# Patient Record
Sex: FEMALE | Race: WHITE | NOT HISPANIC OR LATINO | Employment: OTHER | URBAN - METROPOLITAN AREA
[De-identification: names, ages, dates, MRNs, and addresses within clinical notes are randomized per-mention and may not be internally consistent; named-entity substitution may affect disease eponyms.]

---

## 2017-01-23 ENCOUNTER — OFFICE VISIT (OUTPATIENT)
Dept: FAMILY MEDICINE CLINIC | Facility: CLINIC | Age: 62
End: 2017-01-23

## 2017-01-23 VITALS
HEART RATE: 63 BPM | BODY MASS INDEX: 18.95 KG/M2 | SYSTOLIC BLOOD PRESSURE: 118 MMHG | WEIGHT: 103 LBS | TEMPERATURE: 98.3 F | OXYGEN SATURATION: 98 % | HEIGHT: 62 IN | DIASTOLIC BLOOD PRESSURE: 78 MMHG

## 2017-01-23 DIAGNOSIS — Z00.00 HEALTH CARE MAINTENANCE: ICD-10-CM

## 2017-01-23 DIAGNOSIS — M81.0 OSTEOPOROSIS: Primary | ICD-10-CM

## 2017-01-23 DIAGNOSIS — Z78.0 POST-MENOPAUSAL: ICD-10-CM

## 2017-01-23 DIAGNOSIS — M79.671 RIGHT FOOT PAIN: ICD-10-CM

## 2017-01-23 DIAGNOSIS — H91.93 HEARING LOSS, BILATERAL: ICD-10-CM

## 2017-01-23 DIAGNOSIS — Z00.00 ANNUAL PHYSICAL EXAM: ICD-10-CM

## 2017-01-23 DIAGNOSIS — H61.23 BILATERAL IMPACTED CERUMEN: ICD-10-CM

## 2017-01-23 DIAGNOSIS — Z12.31 ENCOUNTER FOR SCREENING MAMMOGRAM FOR BREAST CANCER: ICD-10-CM

## 2017-01-23 PROCEDURE — 99214 OFFICE O/P EST MOD 30 MIN: CPT | Performed by: FAMILY MEDICINE

## 2017-01-23 NOTE — MR AVS SNAPSHOT
Violeta Neff   2017 8:30 AM   Office Visit    Provider:  Baudilio Nicole DO   Department:  [facility]   Dept Phone:  [phone]                Your Full Care Plan              Your Updated Medication List          This list is accurate as of: 17  9:12 AM.  Always use your most recent med list.                CALTRATE 600+D PO       ibuprofen 600 MG tablet   Commonly known as:  ADVIL,MOTRIN   One tablet po Q6H for inflammation and pain               You Were Diagnosed With        Codes Comments    Osteoporosis    -  Primary ICD-10-CM: M81.0  ICD-9-CM: 733.00     Right foot pain     ICD-10-CM: M79.671  ICD-9-CM: 729.5     Bilateral impacted cerumen     ICD-10-CM: H61.23  ICD-9-CM: 380.4     Encounter for screening mammogram for breast cancer     ICD-10-CM: Z12.31  ICD-9-CM: V76.12     Post-menopausal     ICD-10-CM: Z78.0  ICD-9-CM: V49.81     Health care maintenance     ICD-10-CM: Z00.00  ICD-9-CM: V70.0     Annual physical exam     ICD-10-CM: Z00.00  ICD-9-CM: V70.0       Instructions     None    Patient Instructions History      First Aid [employer] Signup     [facility] [employer] allows you to send messages to your doctor, view your test results, renew your prescriptions, schedule appointments, and more. To sign up, go to [employer] and click on the Sign Up Now link in the New User? box. Enter your [employer] Activation Code exactly as it appears below along with the last four digits of your Social Security Number and your Date of Birth () to complete the sign-up process. If you do not sign up before the expiration date, you must request a new code.    [employer] Activation Code: [email or web address]  Expires: 2017  8:07 AM    If you have questions, you can email [email or web address] or call [phone] to talk to our [employer] staff. Remember, [employer] is NOT to be used for urgent needs. For medical emergencies, dial 911.                "  Other Info from Your Visit           Allergies     Naproxen  Hives      Reason for Visit     Sinus Problem ears feel full    Foot Pain right foot      Vital Signs     Blood Pressure Pulse Temperature Height Weight Oxygen Saturation    118/78 63 98.3 °F (36.8 °C) 62\" (157.5 cm) 103 lb (46.7 kg) 98%    Body Mass Index Smoking Status                18.84 kg/m2 Heavy Tobacco Smoker          Problems and Diagnoses Noted     Osteoporosis    -  Primary    Right foot pain        Excess wax in both ears        Encounter for screening mammogram for breast cancer        Post-menopausal        Health maintenance examination        Annual physical exam          "

## 2017-01-23 NOTE — PATIENT INSTRUCTIONS
The patient has been instructed to soak bilateral ear canals with hydrogen peroxide.  She will return in the next 2-3 days for ear lavage.  Patient will also schedule for full physical exam to include breast exam and Pap smear.  Mammogram and DEXA scan will be obtained.  Patient was found to be osteoporotic in 2014 by her GYN and to date has not been offered treatment.  Fasting labs will be also obtained prior to physical exam-at that time we will review all of the above findings.  Patient has been asked to acquire Spenco cross  orthotics.  If these fail to offer improvement consideration for podiatry referral will be made.

## 2017-01-23 NOTE — PROGRESS NOTES
Subjective   Violeta Neff is a 61 y.o. female with   Chief Complaint   Patient presents with   • Sinus Problem     ears feel full   • Foot Pain     right foot   .    History of Present Illness   61-year-old white female with first visit to this office.  Health questionnaire was completed in its entirety and reviewed.  Medications include calcium that contains vitamin D as well as when necessary ibuprofen.  She has a history of recurrent sinus issues including chronic sinus infections and seasonal allergic rhinitis.  Patient complains of decreased hearing in both ears and wonders if they are occluded by cerumen.  Patient is also having foot pain in her right foot without trauma or initiating event.  Pain is primarily at the distal metatarsals from second metatarsal through fifth.  Patient denies paresthesias or numbness.  The following portions of the patient's history were reviewed and updated as appropriate: allergies, current medications, past family history, past medical history, past social history, past surgical history and problem list.    Review of Systems   HENT: Positive for hearing loss and sinus pressure.    Musculoskeletal:        Right foot pain, osteoporosis   All other systems reviewed and are negative.      Objective     Vitals:    01/23/17 0822   BP: 118/78   Pulse: 63   Temp: 98.3 °F (36.8 °C)   SpO2: 98%       No results found for this or any previous visit (from the past 168 hour(s)).    Physical Exam   Constitutional: She is oriented to person, place, and time. She appears well-developed and well-nourished.   HENT:   Head: Normocephalic and atraumatic.   Right Ear: External ear normal.   Left Ear: External ear normal.   Nose: Nose normal.   Mouth/Throat: Uvula is midline, oropharynx is clear and moist and mucous membranes are normal.   Bilateral cerumen obstruction   Neck: Trachea normal and phonation normal. Neck supple. Normal carotid pulses present. Carotid bruit is not present. No thyroid  mass and no thyromegaly present.   Cardiovascular: Normal rate, regular rhythm and normal heart sounds.  Exam reveals no gallop and no friction rub.    No murmur heard.  Pulses:       Dorsalis pedis pulses are 2+ on the right side, and 2+ on the left side.        Posterior tibial pulses are 1+ on the right side, and 1+ on the left side.   Pulmonary/Chest: Effort normal and breath sounds normal. No respiratory distress. She has no decreased breath sounds. She has no wheezes. She has no rhonchi. She has no rales.   Musculoskeletal:   Right foot tenderness at the heads of the metatarsals distally from the second through the fifth.  Minimal tenderness with compression from lateral size.  Sensation is intact distally to gross palpation.  Correa pedis pulses are +2 with trace posterior tibialis pulses.  There is full range of motion at right ankle and right foot.   Lymphadenopathy:     She has no cervical adenopathy.   Neurological: She is alert and oriented to person, place, and time. She has normal strength. No sensory deficit.   Skin: Skin is warm and dry. No rash noted.   Psychiatric: She has a normal mood and affect. Her speech is normal and behavior is normal. Judgment and thought content normal. Cognition and memory are normal.   Nursing note and vitals reviewed.      Assessment/Plan   Violeta was seen today for sinus problem and foot pain.    Diagnoses and all orders for this visit:    Osteoporosis    Right foot pain  -     XR Foot 3+ View Right; Future    Bilateral impacted cerumen    Encounter for screening mammogram for breast cancer  -     Mammo Screening Bilateral With CAD; Future    Post-menopausal  -     DEXA Bone Density Axial; Future    Health care maintenance  -     CBC & AUTO Differential; Future  -     Comprehensive Metabolic Panel; Future  -     Lipid Panel; Future  -     TSH; Future  -     Vitamin D 25 Hydroxy; Future    Annual physical exam  -     CBC & AUTO Differential; Future  -     Comprehensive  Metabolic Panel; Future  -     Lipid Panel; Future  -     TSH; Future  -     Vitamin D 25 Hydroxy; Future    Hearing loss, bilateral

## 2017-02-03 ENCOUNTER — HOSPITAL ENCOUNTER (OUTPATIENT)
Dept: GENERAL RADIOLOGY | Facility: HOSPITAL | Age: 62
Discharge: HOME | End: 2017-02-03

## 2017-02-03 ENCOUNTER — APPOINTMENT (OUTPATIENT)
Dept: BONE DENSITY | Facility: HOSPITAL | Age: 62
End: 2017-02-03

## 2017-02-03 ENCOUNTER — HOSPITAL ENCOUNTER (OUTPATIENT)
Dept: MAMMOGRAPHY | Facility: HOSPITAL | Age: 62
Discharge: HOME | End: 2017-02-03
Admitting: FAMILY MEDICINE

## 2017-02-03 DIAGNOSIS — Z78.0 POST-MENOPAUSAL: ICD-10-CM

## 2017-02-03 DIAGNOSIS — Z12.31 ENCOUNTER FOR SCREENING MAMMOGRAM FOR BREAST CANCER: ICD-10-CM

## 2017-02-03 DIAGNOSIS — M79.671 RIGHT FOOT PAIN: ICD-10-CM

## 2017-02-03 PROCEDURE — 77080 DXA BONE DENSITY AXIAL: CPT

## 2017-02-03 PROCEDURE — 77063 BREAST TOMOSYNTHESIS BI: CPT

## 2017-02-03 PROCEDURE — 73630 X-RAY EXAM OF FOOT: CPT

## 2017-02-03 PROCEDURE — G0202 SCR MAMMO BI INCL CAD: HCPCS

## 2017-02-23 ENCOUNTER — RESULTS ENCOUNTER (OUTPATIENT)
Dept: FAMILY MEDICINE CLINIC | Facility: CLINIC | Age: 62
End: 2017-02-23

## 2017-02-23 DIAGNOSIS — Z00.00 ANNUAL PHYSICAL EXAM: ICD-10-CM

## 2017-02-23 DIAGNOSIS — Z00.00 HEALTH CARE MAINTENANCE: ICD-10-CM

## 2017-02-24 LAB
25(OH)D3+25(OH)D2 SERPL-MCNC: 37.7 NG/ML
ALBUMIN SERPL-MCNC: 4.4 G/DL (ref 3.5–5.2)
ALBUMIN/GLOB SERPL: 2 G/DL
ALP SERPL-CCNC: 84 U/L (ref 40–129)
ALT SERPL-CCNC: 15 U/L (ref 5–33)
AST SERPL-CCNC: 19 U/L (ref 5–32)
BASOPHILS # BLD AUTO: 0.07 10*3/MM3 (ref 0–0.2)
BASOPHILS NFR BLD AUTO: 0.8 % (ref 0–2)
BILIRUB SERPL-MCNC: 0.2 MG/DL (ref 0.2–1.2)
BUN SERPL-MCNC: 20 MG/DL (ref 8–23)
BUN/CREAT SERPL: 22.2 (ref 7–25)
CALCIUM SERPL-MCNC: 9.3 MG/DL (ref 8.8–10.5)
CHLORIDE SERPL-SCNC: 104 MMOL/L (ref 98–107)
CHOLEST SERPL-MCNC: 204 MG/DL (ref 0–200)
CO2 SERPL-SCNC: 26.2 MMOL/L (ref 22–29)
CREAT SERPL-MCNC: 0.9 MG/DL (ref 0.57–1)
EOSINOPHIL # BLD AUTO: 0.76 10*3/MM3 (ref 0.1–0.3)
EOSINOPHIL NFR BLD AUTO: 8.4 % (ref 0–4)
ERYTHROCYTE [DISTWIDTH] IN BLOOD BY AUTOMATED COUNT: 13 % (ref 11.5–14.5)
GLOBULIN SER CALC-MCNC: 2.2 GM/DL
GLUCOSE SERPL-MCNC: 89 MG/DL (ref 65–99)
HCT VFR BLD AUTO: 44.3 % (ref 37–47)
HDLC SERPL-MCNC: 65 MG/DL (ref 40–60)
HGB BLD-MCNC: 14.3 G/DL (ref 12–16)
IMM GRANULOCYTES # BLD: 0.02 10*3/MM3 (ref 0–0.03)
IMM GRANULOCYTES NFR BLD: 0.2 % (ref 0–0.5)
LDLC SERPL CALC-MCNC: 125 MG/DL (ref 0–100)
LYMPHOCYTES # BLD AUTO: 2.86 10*3/MM3 (ref 0.6–4.8)
LYMPHOCYTES NFR BLD AUTO: 31.6 % (ref 20–45)
MCH RBC QN AUTO: 29.6 PG (ref 27–31)
MCHC RBC AUTO-ENTMCNC: 32.3 G/DL (ref 31–37)
MCV RBC AUTO: 91.7 FL (ref 81–99)
MONOCYTES # BLD AUTO: 0.65 10*3/MM3 (ref 0–1)
MONOCYTES NFR BLD AUTO: 7.2 % (ref 3–8)
NEUTROPHILS # BLD AUTO: 4.68 10*3/MM3 (ref 1.5–8.3)
NEUTROPHILS NFR BLD AUTO: 51.8 % (ref 45–70)
NRBC BLD AUTO-RTO: 0 /100 WBC (ref 0–0)
PLATELET # BLD AUTO: 238 10*3/MM3 (ref 140–500)
POTASSIUM SERPL-SCNC: 4.8 MMOL/L (ref 3.5–5.2)
PROT SERPL-MCNC: 6.6 G/DL (ref 6–8.5)
RBC # BLD AUTO: 4.83 10*6/MM3 (ref 4.2–5.4)
SODIUM SERPL-SCNC: 142 MMOL/L (ref 136–145)
TRIGL SERPL-MCNC: 68 MG/DL (ref 0–150)
TSH SERPL DL<=0.005 MIU/L-ACNC: 2.4 MIU/ML (ref 0.27–4.2)
VLDLC SERPL CALC-MCNC: 13.6 MG/DL (ref 7–27)
WBC # BLD AUTO: 9.04 10*3/MM3 (ref 4.8–10.8)

## 2017-03-06 ENCOUNTER — OFFICE VISIT (OUTPATIENT)
Dept: FAMILY MEDICINE CLINIC | Facility: CLINIC | Age: 62
End: 2017-03-06

## 2017-03-06 VITALS
OXYGEN SATURATION: 98 % | HEART RATE: 71 BPM | DIASTOLIC BLOOD PRESSURE: 56 MMHG | HEIGHT: 62 IN | WEIGHT: 102 LBS | BODY MASS INDEX: 18.77 KG/M2 | TEMPERATURE: 97.8 F | SYSTOLIC BLOOD PRESSURE: 100 MMHG

## 2017-03-06 DIAGNOSIS — E78.2 MIXED HYPERLIPIDEMIA: Primary | ICD-10-CM

## 2017-03-06 DIAGNOSIS — H61.23 BILATERAL IMPACTED CERUMEN: ICD-10-CM

## 2017-03-06 DIAGNOSIS — M81.0 OSTEOPOROSIS: ICD-10-CM

## 2017-03-06 PROCEDURE — 99214 OFFICE O/P EST MOD 30 MIN: CPT | Performed by: FAMILY MEDICINE

## 2017-03-06 RX ORDER — ALENDRONATE SODIUM 70 MG/1
70 TABLET ORAL
Qty: 12 TABLET | Refills: 1 | Status: SHIPPED | OUTPATIENT
Start: 2017-03-06 | End: 2017-03-06 | Stop reason: SDUPTHER

## 2017-03-06 RX ORDER — ALENDRONATE SODIUM 70 MG/1
70 TABLET ORAL
Qty: 4 TABLET | Refills: 11 | Status: SHIPPED | OUTPATIENT
Start: 2017-03-06 | End: 2018-03-12 | Stop reason: SDUPTHER

## 2017-03-06 NOTE — PROGRESS NOTES
Subjective   Violeta Neff is a 61 y.o. female with   Chief Complaint   Patient presents with   • Osteoporosis     discuss labs   • Ear Fullness     check ears   .    History of Present Illness     Violeta is a 61 yr old white female that presents today for follow up after completing a recent Dexa Scan and for HLD.  Currently Violeta does not use medications to control cholesterol.  Fasting labs have been performed prior to this visit.  Both DEXA scan and mammography were also performed prior to this visit.    Violeta has additional complaints of the feeling of fullness to her ears.  Previous exam had identified cerumen obstruction with patient using hydrogen peroxide as instructed.  She is requesting further evaluation and treatment of same.    The following portions of the patient's history were reviewed and updated as appropriate: allergies, current medications, past family history, past medical history, past social history, past surgical history and problem list.    Review of Systems   HENT: Positive for ear pain.    Cardiovascular:        HLD   Musculoskeletal:        Osteoporosis   All other systems reviewed and are negative.      Objective     Vitals:    03/06/17 1454   BP: 100/56   Pulse: 71   Temp: 97.8 °F (36.6 °C)   SpO2: 98%     BP Readings from Last 3 Encounters:   03/06/17 100/56   01/23/17 118/78   10/02/16 129/72      Wt Readings from Last 3 Encounters:   03/06/17 102 lb (46.3 kg)   01/23/17 103 lb (46.7 kg)      Results Encounter on 02/23/2017   Component Date Value Ref Range Status   • WBC 02/24/2017 9.04  4.80 - 10.80 10*3/mm3 Final   • RBC 02/24/2017 4.83  4.20 - 5.40 10*6/mm3 Final   • Hemoglobin 02/24/2017 14.3  12.0 - 16.0 g/dL Final   • Hematocrit 02/24/2017 44.3  37.0 - 47.0 % Final   • MCV 02/24/2017 91.7  81.0 - 99.0 fL Final   • MCH 02/24/2017 29.6  27.0 - 31.0 pg Final   • MCHC 02/24/2017 32.3  31.0 - 37.0 g/dL Final   • RDW 02/24/2017 13.0  11.5 - 14.5 % Final   • Platelets 02/24/2017 238   140 - 500 10*3/mm3 Final   • Neutrophil Rel % 02/24/2017 51.8  45.0 - 70.0 % Final   • Lymphocyte Rel % 02/24/2017 31.6  20.0 - 45.0 % Final   • Monocyte Rel % 02/24/2017 7.2  3.0 - 8.0 % Final   • Eosinophil Rel % 02/24/2017 8.4* 0.0 - 4.0 % Final   • Basophil Rel % 02/24/2017 0.8  0.0 - 2.0 % Final   • Neutrophils Absolute 02/24/2017 4.68  1.50 - 8.30 10*3/mm3 Final   • Lymphocytes Absolute 02/24/2017 2.86  0.60 - 4.80 10*3/mm3 Final   • Monocytes Absolute 02/24/2017 0.65  0.00 - 1.00 10*3/mm3 Final   • Eosinophils Absolute 02/24/2017 0.76* 0.10 - 0.30 10*3/mm3 Final   • Basophils Absolute 02/24/2017 0.07  0.00 - 0.20 10*3/mm3 Final   • Immature Granulocyte Rel % 02/24/2017 0.2  0.0 - 0.5 % Final   • Immature Grans Absolute 02/24/2017 0.02  0.00 - 0.03 10*3/mm3 Final   • nRBC 02/24/2017 0.0  0.0 - 0.0 /100 WBC Final   • Glucose 02/24/2017 89  65 - 99 mg/dL Final   • BUN 02/24/2017 20  8 - 23 mg/dL Final   • Creatinine 02/24/2017 0.90  0.57 - 1.00 mg/dL Final   • eGFR Non  Am 02/24/2017 63  >60 mL/min/1.73 Final   • eGFR African Am 02/24/2017 77  >60 mL/min/1.73 Final   • BUN/Creatinine Ratio 02/24/2017 22.2  7.0 - 25.0 Final   • Sodium 02/24/2017 142  136 - 145 mmol/L Final   • Potassium 02/24/2017 4.8  3.5 - 5.2 mmol/L Final   • Chloride 02/24/2017 104  98 - 107 mmol/L Final   • Total CO2 02/24/2017 26.2  22.0 - 29.0 mmol/L Final   • Calcium 02/24/2017 9.3  8.8 - 10.5 mg/dL Final   • Total Protein 02/24/2017 6.6  6.0 - 8.5 g/dL Final   • Albumin 02/24/2017 4.40  3.50 - 5.20 g/dL Final   • Globulin 02/24/2017 2.2  gm/dL Final   • A/G Ratio 02/24/2017 2.0  g/dL Final   • Total Bilirubin 02/24/2017 0.2  0.2 - 1.2 mg/dL Final   • Alkaline Phosphatase 02/24/2017 84  40 - 129 U/L Final   • AST (SGOT) 02/24/2017 19  5 - 32 U/L Final   • ALT (SGPT) 02/24/2017 15  5 - 33 U/L Final   • Total Cholesterol 02/24/2017 204* 0 - 200 mg/dL Final   • Triglycerides 02/24/2017 68  0 - 150 mg/dL Final   • HDL Cholesterol  02/24/2017 65* 40 - 60 mg/dL Final   • VLDL Cholesterol 02/24/2017 13.6  7 - 27 mg/dL Final   • LDL Cholesterol  02/24/2017 125* 0 - 100 mg/dL Final   • TSH 02/24/2017 2.400  0.270 - 4.200 mIU/mL Final   • 25 Hydroxy, Vitamin D 02/24/2017 37.7  ng/mL Final    Comment: Reference Range for Total Vitamin D 25(OH)  Deficiency    <20.0 ng/mL  Insufficiency 21-29 ng/mL  Sufficiency   30-74 ng/mL       The above results have been reviewed and explained to Violeta with her understanding.  A copy has been provided to her.    Physical Exam   Constitutional: She is oriented to person, place, and time. She appears well-developed and well-nourished.   HENT:   Head: Normocephalic and atraumatic.   Right Ear: Hearing normal.   Left Ear: Hearing normal.   Bilateral TM are occluded by cerumen   Neck: Trachea normal and phonation normal. Neck supple. Normal carotid pulses present. Carotid bruit is not present. No thyroid mass and no thyromegaly present.   Cardiovascular: Normal rate, regular rhythm and normal heart sounds.  Exam reveals no gallop and no friction rub.    No murmur heard.  Pulmonary/Chest: Effort normal and breath sounds normal. No respiratory distress. She has no decreased breath sounds. She has no wheezes. She has no rhonchi. She has no rales.   Lymphadenopathy:     She has no cervical adenopathy.   Neurological: She is alert and oriented to person, place, and time.   Skin: Skin is warm and dry. No rash noted.   Psychiatric: She has a normal mood and affect. Her speech is normal and behavior is normal. Judgment and thought content normal. Cognition and memory are normal.   Nursing note and vitals reviewed.   DEXA scan with T score of lumbar spine and -2.2.  T score of left femoral neck and -3.3.  Mammography was completely normal.    Assessment/Plan   Violeta was seen today for osteoporosis and ear fullness.    Diagnoses and all orders for this visit:    Mixed hyperlipidemia    Osteoporosis    Bilateral impacted  cerumen    Other orders  -     Discontinue: alendronate (FOSAMAX) 70 MG tablet; Take 1 tablet by mouth Every 7 (Seven) Days.  -     alendronate (FOSAMAX) 70 MG tablet; Take 1 tablet by mouth Every 7 (Seven) Days.        Scribed for Baudilio Nicole MD by Nathan Broussard. 03/06/2017    IBaudilio MD personally performed the services described in this documentation, as scribed by Nathan Broussard in my presence, and it is both accurate and complete

## 2017-03-07 NOTE — PATIENT INSTRUCTIONS
Full discussion in regards to treatment of osteoporosis was undertaken with patient choosing Fosamax to initiate treatment with.  Full explanation of how to use this product including using this once a week on an empty stomach with a full 6-8 ounce glass of water.  She must remain upright for at least one hour.  She will contact this office with any onset of reflux symptoms.  Bilateral ears were completely lavaged to clear.  Low cholesterol dietary handout was given with expectation of repeat labs in 6 months.  Patient is a smoker with high cholesterol with goal of LDL of 100 or less.  She will schedule in the near future to have a Pap smear.

## 2017-04-12 ENCOUNTER — TELEPHONE (OUTPATIENT)
Dept: FAMILY MEDICINE CLINIC | Facility: CLINIC | Age: 62
End: 2017-04-12

## 2017-04-12 ENCOUNTER — PROCEDURE VISIT (OUTPATIENT)
Dept: FAMILY MEDICINE CLINIC | Facility: CLINIC | Age: 62
End: 2017-04-12

## 2017-04-12 VITALS
SYSTOLIC BLOOD PRESSURE: 120 MMHG | HEART RATE: 76 BPM | BODY MASS INDEX: 18.58 KG/M2 | OXYGEN SATURATION: 99 % | HEIGHT: 62 IN | WEIGHT: 101 LBS | DIASTOLIC BLOOD PRESSURE: 82 MMHG | TEMPERATURE: 98.2 F

## 2017-04-12 DIAGNOSIS — E78.2 MIXED HYPERLIPIDEMIA: ICD-10-CM

## 2017-04-12 DIAGNOSIS — M62.838 SPASM OF RIGHT PIRIFORMIS MUSCLE: ICD-10-CM

## 2017-04-12 DIAGNOSIS — Z01.419 ENCOUNTER FOR GYNECOLOGICAL EXAMINATION (GENERAL) (ROUTINE) WITHOUT ABNORMAL FINDINGS: Primary | ICD-10-CM

## 2017-04-12 DIAGNOSIS — Z00.00 HEALTHCARE MAINTENANCE: ICD-10-CM

## 2017-04-12 DIAGNOSIS — M81.0 OSTEOPOROSIS: ICD-10-CM

## 2017-04-12 LAB
DEVELOPER EXPIRATION DATE: NORMAL
DEVELOPER LOT NUMBER: NORMAL
EXPIRATION DATE: NORMAL
FECAL OCCULT BLOOD SCREEN, POC: NEGATIVE
Lab: NORMAL
NEGATIVE CONTROL: NEGATIVE
POSITIVE CONTROL: POSITIVE

## 2017-04-12 PROCEDURE — 99396 PREV VISIT EST AGE 40-64: CPT | Performed by: FAMILY MEDICINE

## 2017-04-12 PROCEDURE — 82274 ASSAY TEST FOR BLOOD FECAL: CPT | Performed by: FAMILY MEDICINE

## 2017-04-12 RX ORDER — METAXALONE 800 MG/1
800 TABLET ORAL 3 TIMES DAILY PRN
Qty: 30 TABLET | Refills: 0 | Status: SHIPPED | OUTPATIENT
Start: 2017-04-12 | End: 2017-04-12

## 2017-04-12 RX ORDER — HYDROCODONE BITARTRATE AND ACETAMINOPHEN 5; 325 MG/1; MG/1
TABLET ORAL
Qty: 30 TABLET | Refills: 0 | Status: SHIPPED | OUTPATIENT
Start: 2017-04-12 | End: 2018-03-07

## 2017-04-12 RX ORDER — CYCLOBENZAPRINE HCL 10 MG
10 TABLET ORAL 3 TIMES DAILY PRN
Qty: 30 TABLET | Refills: 0 | OUTPATIENT
Start: 2017-04-12 | End: 2018-03-13

## 2017-04-13 NOTE — PATIENT INSTRUCTIONS
Patient will be provided muscle relaxer and given hydrocodone to be used very judiciously for severe pain.  Stretching program has been taught with patient voicing understanding.  After 2-3 days of participating in the stretching program if her symptoms fail to show improvement will consider referral to physical therapy.  Self breast exams were taught and encouraged on a monthly basis.  Patient will receive mammogram and Dexa scan in February 2018.  Other chronic medications will be continued without alteration.

## 2017-04-13 NOTE — PROGRESS NOTES
"Albert Neff is a 61 y.o. woman who comes in today for a  pap smear only. Patient is a  1 para 1 Ab 0 with last menstrual period of approximately 12 years ago.  Her most recent annual exam was more than 2 years ago and showed no abnormalities. Previous abnormal Pap smears: no. Contraception: post menopausal status. Last mammogram 3 months ago and found to be normal. Last Dexa scan 3 months ago and showed osteoporosis.  Patient is now on Fosamax.  Her primary acute complaint is right-sided low back pain after bending over to attend to \"coreen litter\" this morning.  She states that she bent over she felt something go in her back and acute pain ensued.  She denies any radicular symptoms.  There is increased spasm with great difficulty in moving.  She has used some ibuprofen this morning and is requesting something for pain and discomfort.  Last labs were performed in February of this year and found to be completely normal.  This included calcium levels as well as vitamin D levels.    The following portions of the patient's history were reviewed and updated as appropriate: allergies, current medications, past family history, past medical history, past social history, past surgical history and problem list.    Review of Systems  Pertinent items are noted in HPI.    neck is supple without adenopathy or thyromegaly.  Carotid upstrokes +2 and symmetrical with no evidence of bruit bilaterally.  Heart regular rhythm without murmur gallop or rub.  Lungs clear to auscultation with good bilateral breath sounds.  Abdomen is soft and flat with positive normoactive bowel sounds.  There is no evidence of mass organomegaly.  No evidence of tenderness with deep palpation.  No evidence of guarding or rebound.  Distal pulses are +2 and symmetrical.  Breasts are symmetrical with nipples that are inverted/everted and with/without discharge.  Breasts are nontender and without skin changes.  There is no mass palpable.  " "LS-spine with limited range of motion given acute pain.  Point of maximum tenderness is in the right SI region and acute.  Negative sciatic notch tenderness and there is no midline palpatory tenderness.  Bilateral lower extremities with good tone, motor 5 over 5 in neurovascular is intact distally.  Negative SLR at 60° bilaterally.    Objective   /82  Pulse 76  Temp 98.2 °F (36.8 °C)  Ht 62\" (157.5 cm)  Wt 101 lb (45.8 kg)  SpO2 99%  BMI 18.47 kg/m2  Pelvic Exam: cervix normal in appearance, external genitalia normal, no adnexal masses or tenderness, no bladder tenderness, no cervical motion tenderness, perianal skin: no external genital warts noted, rectovaginal septum normal, urethra without abnormality or discharge, uterus normal size, shape, and consistency and vagina normal without discharge. Pap smear obtained.    Assessment/Plan   Screening pap smear.    Follow up in 2 years, or as indicated by Pap results.    Violeta was seen today for gynecologic exam and back pain.    Diagnoses and all orders for this visit:    Encounter for gynecological examination (general) (routine) without abnormal findings    Spasm of right piriformis muscle    Healthcare maintenance  -     POCT occult blood x 1 stool  -     Pap IG, Rfx HPV ASCU    Osteoporosis    Mixed hyperlipidemia    Other orders  -     Discontinue: metaxalone (SKELAXIN) 800 MG tablet; Take 1 tablet by mouth 3 (Three) Times a Day As Needed for Muscle Spasms.  -     HYDROcodone-acetaminophen (NORCO) 5-325 MG per tablet; 1 po q 6 hours prn pain          Current Outpatient Prescriptions:   •  alendronate (FOSAMAX) 70 MG tablet, Take 1 tablet by mouth Every 7 (Seven) Days., Disp: 4 tablet, Rfl: 11  •  Calcium Carbonate-Vitamin D (CALTRATE 600+D PO), Take  by mouth., Disp: , Rfl:   •  ibuprofen (ADVIL,MOTRIN) 600 MG tablet, One tablet po Q6H for inflammation and pain, Disp: 40 tablet, Rfl: 0  •  cyclobenzaprine (FLEXERIL) 10 MG tablet, Take 1 tablet by mouth " 3 (Three) Times a Day As Needed for Muscle Spasms., Disp: 30 tablet, Rfl: 0  •  HYDROcodone-acetaminophen (NORCO) 5-325 MG per tablet, 1 po q 6 hours prn pain, Disp: 30 tablet, Rfl: 0

## 2017-04-14 LAB
CONV .: NORMAL
CYTOLOGIST CVX/VAG CYTO: NORMAL
CYTOLOGY CVX/VAG DOC THIN PREP: NORMAL
DX ICD CODE: NORMAL
HIV 1 & 2 AB SER-IMP: NORMAL
OTHER STN SPEC: NORMAL
PATH REPORT.FINAL DX SPEC: NORMAL
STAT OF ADQ CVX/VAG CYTO-IMP: NORMAL

## 2018-02-16 ENCOUNTER — APPOINTMENT (OUTPATIENT)
Dept: GENERAL RADIOLOGY | Facility: HOSPITAL | Age: 63
End: 2018-02-16

## 2018-02-16 PROCEDURE — 73630 X-RAY EXAM OF FOOT: CPT | Performed by: FAMILY MEDICINE

## 2018-03-07 ENCOUNTER — OFFICE VISIT (OUTPATIENT)
Dept: FAMILY MEDICINE CLINIC | Facility: CLINIC | Age: 63
End: 2018-03-07

## 2018-03-07 VITALS
SYSTOLIC BLOOD PRESSURE: 120 MMHG | HEART RATE: 67 BPM | OXYGEN SATURATION: 98 % | HEIGHT: 62 IN | DIASTOLIC BLOOD PRESSURE: 66 MMHG | WEIGHT: 90 LBS | BODY MASS INDEX: 16.56 KG/M2 | TEMPERATURE: 97.7 F

## 2018-03-07 DIAGNOSIS — G57.01 PIRIFORMIS SYNDROME OF RIGHT SIDE: ICD-10-CM

## 2018-03-07 DIAGNOSIS — M80.072A: Primary | ICD-10-CM

## 2018-03-07 DIAGNOSIS — M80.00XD AGE-RELATED OSTEOPOROSIS WITH CURRENT PATHOLOGICAL FRACTURE WITH ROUTINE HEALING, SUBSEQUENT ENCOUNTER: ICD-10-CM

## 2018-03-07 DIAGNOSIS — Z79.899 HIGH RISK MEDICATION USE: ICD-10-CM

## 2018-03-07 PROCEDURE — 99214 OFFICE O/P EST MOD 30 MIN: CPT | Performed by: FAMILY MEDICINE

## 2018-03-07 RX ORDER — HYDROCODONE BITARTRATE AND ACETAMINOPHEN 5; 325 MG/1; MG/1
TABLET ORAL
Qty: 30 TABLET | Refills: 0 | Status: SHIPPED | OUTPATIENT
Start: 2018-03-07 | End: 2018-05-16

## 2018-03-07 NOTE — PROGRESS NOTES
Subjective   Violeta Neff is a 62 y.o. female with   Chief Complaint   Patient presents with   • Foot Pain     follow up broken left foot   • Back Pain     twisted last night, and is in alot of pain   .    History of Present Illness     Patient presents today for follow up of left foot pain since Feb 1 2018 with no known injury.  She was seen at [facility] on Feb 16 2018 and had xrays of the left foot demonstrating oblique fracture of the 2nd metatarsal.  This pain is improving.  Patient is Osteoporotic using Fosamax and is concerned about how easily the fracture happened without injury.      Last night, she was bending over to clean something and pulled a muscle in her back.  Her back is painful to the touch on the lower right side.  Moving her back causes increased pain.  She denies pain to her lower right extremity.  She did the same thing to her back in the exact same place one year ago.At that time the diagnosis of piriformis syndrome was acquired and patient was taught piriformis stretching exercises.  She states within 2-3 weeks with stretching the symptoms resolved.  Pt used ice and used Ibuprofen with improvement of symptoms.       The following portions of the patient's history were reviewed and updated as appropriate: allergies, current medications, past family history, past medical history, past social history, past surgical history and problem list.    Review of Systems   Musculoskeletal: Positive for arthralgias (left foot pain) and back pain.   All other systems reviewed and are negative.      Objective     Vitals:    03/07/18 1400   BP: 120/66   Pulse: 67   Temp: 97.7 °F (36.5 °C)   SpO2: 98%     BP Readings from Last 3 Encounters:   03/07/18 120/66   02/16/18 134/74   04/12/17 120/82      Wt Readings from Last 3 Encounters:   03/07/18 40.8 kg (90 lb)   04/12/17 45.8 kg (101 lb)   03/06/17 46.3 kg (102 lb)        Physical Exam   Constitutional: She is oriented to person, place, and  time. She appears well-developed and well-nourished.   HENT:   Head: Normocephalic and atraumatic.   Neck: Trachea normal and phonation normal. Neck supple. Normal carotid pulses present. Carotid bruit is not present. No thyroid mass and no thyromegaly present.   Cardiovascular: Normal rate, regular rhythm and normal heart sounds.  Exam reveals no gallop and no friction rub.    No murmur heard.  Pulmonary/Chest: Effort normal and breath sounds normal. No respiratory distress. She has no decreased breath sounds. She has no wheezes. She has no rhonchi. She has no rales.   Musculoskeletal:        Lumbar back: She exhibits pain.        Back:         Left foot: There is deformity.        Lymphadenopathy:     She has no cervical adenopathy.   Neurological: She is alert and oriented to person, place, and time.   Skin: Skin is warm and dry. No rash noted.   Psychiatric: She has a normal mood and affect. Her speech is normal and behavior is normal. Judgment and thought content normal. Cognition and memory are normal.   Nursing note and vitals reviewed.      Assessment/Plan   Violeta was seen today for foot pain and back pain.    Diagnoses and all orders for this visit:    Pathological fracture of left foot due to osteoporosis, unspecified osteoporosis type, initial encounter  -     DEXA Bone Density Axial  -     XR Foot 3+ View Left    Piriformis syndrome of right side    High risk medication use  -     [id number] [facility] -    Age-related osteoporosis with current pathological fracture with routine healing, subsequent encounter    Other orders  -     HYDROcodone-acetaminophen (NORCO) 5-325 MG per tablet; 1 po q 6 hours prn pain        Return in about 4 weeks (around 4/4/2018), or if symptoms worsen or fail to improve.        Scribed for Baudilio Nicole MD by Nathan Broussard. 03/07/2018    IBaudilio MD personally performed the services described in this documentation, as scribed by Nathan Broussard in my presence, and it  is both accurate and complete

## 2018-03-08 LAB
AMPHETAMINES UR QL SCN: NEGATIVE NG/ML
BARBITURATES UR QL SCN: NEGATIVE NG/ML
BENZODIAZ UR QL: NEGATIVE NG/ML
BZE UR QL: NEGATIVE NG/ML
CANNABINOIDS UR QL SCN: NEGATIVE NG/ML
OPIATES UR QL: NEGATIVE NG/ML
PCP UR QL: NEGATIVE NG/ML

## 2018-03-12 ENCOUNTER — HOSPITAL ENCOUNTER (OUTPATIENT)
Dept: BONE DENSITY | Facility: HOSPITAL | Age: 63
Discharge: HOME | End: 2018-03-12
Admitting: FAMILY MEDICINE

## 2018-03-12 ENCOUNTER — APPOINTMENT (OUTPATIENT)
Dept: BONE DENSITY | Facility: HOSPITAL | Age: 63
End: 2018-03-12

## 2018-03-12 ENCOUNTER — HOSPITAL ENCOUNTER (OUTPATIENT)
Dept: GENERAL RADIOLOGY | Facility: HOSPITAL | Age: 63
Discharge: HOME | End: 2018-03-12

## 2018-03-12 PROCEDURE — 77080 DXA BONE DENSITY AXIAL: CPT

## 2018-03-12 PROCEDURE — 73630 X-RAY EXAM OF FOOT: CPT

## 2018-03-12 RX ORDER — ALENDRONATE SODIUM 70 MG/1
TABLET ORAL
Qty: 4 TABLET | Refills: 0 | Status: SHIPPED | OUTPATIENT
Start: 2018-03-12 | End: 2018-03-29 | Stop reason: ALTCHOICE

## 2018-03-29 ENCOUNTER — OFFICE VISIT (OUTPATIENT)
Dept: FAMILY MEDICINE CLINIC | Facility: CLINIC | Age: 63
End: 2018-03-29

## 2018-03-29 VITALS
WEIGHT: 91.2 LBS | SYSTOLIC BLOOD PRESSURE: 122 MMHG | HEART RATE: 64 BPM | TEMPERATURE: 98 F | DIASTOLIC BLOOD PRESSURE: 64 MMHG | HEIGHT: 61 IN | OXYGEN SATURATION: 98 % | RESPIRATION RATE: 16 BRPM | BODY MASS INDEX: 17.22 KG/M2

## 2018-03-29 DIAGNOSIS — S92.325S CLOSED NONDISPLACED FRACTURE OF SECOND METATARSAL BONE OF LEFT FOOT, SEQUELA: Primary | ICD-10-CM

## 2018-03-29 DIAGNOSIS — M80.00XD AGE-RELATED OSTEOPOROSIS WITH CURRENT PATHOLOGICAL FRACTURE WITH ROUTINE HEALING, SUBSEQUENT ENCOUNTER: ICD-10-CM

## 2018-03-29 PROCEDURE — 99213 OFFICE O/P EST LOW 20 MIN: CPT | Performed by: FAMILY MEDICINE

## 2018-03-29 NOTE — PROGRESS NOTES
Subjective   Violeta Neff is a 62 y.o. female with   Chief Complaint   Patient presents with   • Follow-up     on Dexa Scan   .    History of Present Illness      Pt follows up for fracture of metatarsal of the left foot. Patient continues to wear a postop shoe on the right foot.  Last x-ray was approximately 3 weeks ago showing good callus formation around area fracture.  Alignment was adequate.  Patient completed a Dexa Scan to evaluate status of bone mineralization.  Patient states that she is a walker for exercise.  Patient currently is using Fosamax for Osteoporosis and was doing so before right foot fracture.  There has been no appreciative improvement with the use of Fosamax.     The following portions of the patient's history were reviewed and updated as appropriate: allergies, current medications, past family history, past medical history, past social history, past surgical history and problem list.    Review of Systems   Musculoskeletal: Positive for arthralgias (FX of left foot metatarsal).        Osteoporosis       Objective     Vitals:    03/29/18 1020   BP: 122/64   Pulse: 64   Resp: 16   Temp: 98 °F (36.7 °C)   SpO2: 98%     BP Readings from Last 3 Encounters:   03/29/18 122/64   03/07/18 120/66   02/16/18 134/74      Wt Readings from Last 3 Encounters:   03/29/18 41.4 kg (91 lb 3.2 oz)   03/07/18 40.8 kg (90 lb)   04/12/17 45.8 kg (101 lb)        Physical Exam   Constitutional: She is oriented to person, place, and time. She appears well-developed and well-nourished.   HENT:   Head: Normocephalic and atraumatic.   Neck: Trachea normal and phonation normal. Neck supple. Normal carotid pulses present. Carotid bruit is not present. No thyroid mass and no thyromegaly present.   Cardiovascular: Normal rate, regular rhythm and normal heart sounds.  Exam reveals no gallop and no friction rub.    No murmur heard.  Pulmonary/Chest: Effort normal and breath sounds normal. No respiratory distress. She has no  decreased breath sounds. She has no wheezes. She has no rhonchi. She has no rales.   Lymphadenopathy:     She has no cervical adenopathy.   Neurological: She is alert and oriented to person, place, and time.   Skin: Skin is warm and dry. No rash noted.   Psychiatric: She has a normal mood and affect. Her speech is normal and behavior is normal. Judgment and thought content normal. Cognition and memory are normal.   Nursing note and vitals reviewed.      Assessment/Plan   Violeta was seen today for follow-up.    Diagnoses and all orders for this visit:    Closed nondisplaced fracture of second metatarsal bone of left foot, sequela  -     XR Foot 3+ View Left    Age-related osteoporosis with current pathological fracture with routine healing, subsequent encounter  -     denosumab (PROLIA) 60 MG/ML solution syringe; Inject 1 mL under the skin 1 (One) Time for 1 dose.        Return for Next scheduled follow up.        Scribed for Baudilio Nicole MD by Nathan Broussard. 03/29/2018    IBaudilio MD personally performed the services described in this documentation, as scribed by Nathan Broussard in my presence, and it is both accurate and complete

## 2018-03-30 PROBLEM — S92.325A CLOSED NONDISPLACED FRACTURE OF SECOND METATARSAL BONE OF LEFT FOOT: Status: ACTIVE | Noted: 2018-03-30

## 2018-04-05 ENCOUNTER — HOSPITAL ENCOUNTER (OUTPATIENT)
Dept: GENERAL RADIOLOGY | Facility: HOSPITAL | Age: 63
Discharge: HOME | End: 2018-04-05
Admitting: FAMILY MEDICINE

## 2018-04-05 DIAGNOSIS — S92.902K CLOSED FRACTURE OF LEFT FOOT WITH NONUNION, SUBSEQUENT ENCOUNTER: Primary | ICD-10-CM

## 2018-04-05 PROCEDURE — 73630 X-RAY EXAM OF FOOT: CPT

## 2018-04-16 RX ORDER — ALENDRONATE SODIUM 70 MG/1
TABLET ORAL
Qty: 4 TABLET | Refills: 0 | Status: SHIPPED | OUTPATIENT
Start: 2018-04-16 | End: 2018-04-16 | Stop reason: SDUPTHER

## 2018-04-16 RX ORDER — ALENDRONATE SODIUM 70 MG/1
70 TABLET ORAL WEEKLY
Qty: 4 TABLET | Refills: 0 | Status: SHIPPED | OUTPATIENT
Start: 2018-04-16 | End: 2018-05-01 | Stop reason: SDUPTHER

## 2018-04-23 ENCOUNTER — OFFICE VISIT (OUTPATIENT)
Dept: ORTHOPEDIC SURGERY | Facility: CLINIC | Age: 63
End: 2018-04-23

## 2018-04-23 VITALS — HEIGHT: 61 IN | TEMPERATURE: 97.5 F | WEIGHT: 97.2 LBS | BODY MASS INDEX: 18.35 KG/M2

## 2018-04-23 DIAGNOSIS — S99.922A FOOT INJURY, LEFT, INITIAL ENCOUNTER: ICD-10-CM

## 2018-04-23 DIAGNOSIS — S92.322A CLOSED DISPLACED FRACTURE OF SECOND METATARSAL BONE OF LEFT FOOT, INITIAL ENCOUNTER: Primary | ICD-10-CM

## 2018-04-23 PROCEDURE — 99244 OFF/OP CNSLTJ NEW/EST MOD 40: CPT | Performed by: ORTHOPAEDIC SURGERY

## 2018-04-23 PROCEDURE — 73630 X-RAY EXAM OF FOOT: CPT | Performed by: ORTHOPAEDIC SURGERY

## 2018-04-23 PROCEDURE — 28470 CLTX METATARSAL FX WO MNP EA: CPT | Performed by: ORTHOPAEDIC SURGERY

## 2018-04-23 RX ORDER — PENICILLIN V POTASSIUM 500 MG/1
TABLET ORAL
COMMUNITY
Start: 2018-04-22 | End: 2018-06-06

## 2018-04-23 NOTE — PROGRESS NOTES
New Patient Complaint      Patient: Violeta Neff  YOB: 1955 62 y.o. female  Medical Record Number: [id number]    Chief Complaints: My foot hurts    History of Present Illness: Patient describes moderate intermittent grinding aching pain with swelling in the left forefoot that began around 2/3/18 while getting out of bed.  Prior to that she had been doing a lot of increased standing walking and taking care of her mother.  She's been followed by her primary care physician for this and symptoms have been improved some with use of anti-inflammatories and rest including use of postoperative shoe which she does not have on today.    2 weeks ago she began taking 1800 units of vitamin D daily rather than the 1200 she been taking prior to this      She is seen today at the request of Dr. Nicole who has requested my opinion regarding etiology and further treatment of this condition as patient has persistent pain and swelling       HPI    Allergies:   Allergies   Allergen Reactions   • Naproxen Hives       Medications:   Current Outpatient Prescriptions on File Prior to Visit   Medication Sig   • alendronate (FOSAMAX) 70 MG tablet Take 1 tablet by mouth 1 (One) Time Per Week. BEFORE BREAKFAST ON AN EMPTY STOMACH: REMAIN UPRIGHT FOR 30 MINUTES   • Calcium Carbonate-Vitamin D (CALTRATE 600+D PO) Take  by mouth.   • HYDROcodone-acetaminophen (NORCO) 5-325 MG per tablet 1 po q 6 hours prn pain   • ibuprofen (ADVIL,MOTRIN) 600 MG tablet One tablet po Q6H for inflammation and pain     No current facility-administered medications on file prior to visit.        No past medical history on file.  Past Surgical History:   Procedure Laterality Date   •  SECTION     • CHOLECYSTECTOMY       Social History     Occupational History   • Not on file.     Social History Main Topics   • Smoking status: Heavy Tobacco Smoker     Packs/day: 0.50     Types: Cigarettes   • Smokeless tobacco: Never Used   • Alcohol use No    • Drug use: No   • Sexual activity: Defer      Social History     Social History Narrative   • No narrative on file     Family History   Problem Relation Age of Onset   • No Known Problems Mother    • Cancer Father      leukemia       Review of Systems: 14 point review of systems performed, positive pertinent findings identified in HPI. All remaining systems negative     Review of Systems      Physical Exam:   There were no vitals filed for this visit.  Physical Exam   Constitutional: pleasant, well developed   Eyes: sclera non icteric  Hearing : adequate for exam  Cardiovascular: palpable pulses in left foot, left calf/ thigh NT without sign of DVT  Respiratoy: breathing unlabored   Neurological: grossly sensate to LT throughout left LE  Psychiatric: oriented with normal mood and affect.   Lymphatic: No palpable popliteal lymphadenopathy left LE  Skin: intact throughout left leg/foot  Musculoskeletal: Nonantalgic gait.  She is in a regular shoe today.  There is mild fullness and discomfort palpation over the dorsum of the second metatarsal with palpable callus formation are some discomfort with manipulation of the second metatarsal she is able to actively dorsiflex and plantarflex the toes.  Physical Exam  Ortho Exam    Radiology: 3 views of the left foot were reviewed from 4/5/17 and compared with x-rays from 3/7/15 which showed a mildly displaced oblique fracture the midportion of the left second metatarsal with increased callus formation compared with previous x-rays and without significant change in alignment.  Fracture line is still somewhat evident    3 views left foot were ordered today to evaluate pain reviewed and compared to previous x-rays.  There is still callus formation but x-ray show fracture line is still somewhat evident    Vitamin D 2/24/17 37.7    Assessment/Plan: 1.  Left second metatarsal stress fracture    Given her persistent pain and x-ray findings at think we need to go ahead and get a CT  scan to evaluate the degree of actual healing as this may require use of a bone stimulator or increased vitamin D augmentation.  Possibly surgical treatment if she fails to improve.    I've recommended use of her postoperative shoe as much as possible which she will get back into.    She understands to call to schedule follow-up appointment after CT is been scheduled and ordered to review results in the office.

## 2018-04-27 ENCOUNTER — APPOINTMENT (OUTPATIENT)
Dept: CT IMAGING | Facility: HOSPITAL | Age: 63
End: 2018-04-27
Attending: ORTHOPAEDIC SURGERY

## 2018-05-01 ENCOUNTER — HOSPITAL ENCOUNTER (OUTPATIENT)
Dept: CT IMAGING | Facility: HOSPITAL | Age: 63
Discharge: HOME | End: 2018-05-01
Attending: ORTHOPAEDIC SURGERY | Admitting: ORTHOPAEDIC SURGERY

## 2018-05-01 ENCOUNTER — TELEPHONE (OUTPATIENT)
Dept: FAMILY MEDICINE CLINIC | Facility: CLINIC | Age: 63
End: 2018-05-01

## 2018-05-01 DIAGNOSIS — S92.322A CLOSED DISPLACED FRACTURE OF SECOND METATARSAL BONE OF LEFT FOOT, INITIAL ENCOUNTER: ICD-10-CM

## 2018-05-01 PROCEDURE — 73700 CT LOWER EXTREMITY W/O DYE: CPT

## 2018-05-01 RX ORDER — ALENDRONATE SODIUM 70 MG/1
70 TABLET ORAL WEEKLY
Qty: 4 TABLET | Refills: 0 | Status: SHIPPED | OUTPATIENT
Start: 2018-05-01 | End: 2019-03-25

## 2018-05-01 NOTE — TELEPHONE ENCOUNTER
Osteopetrosis medication fosamax needs refilled, as she only has one more dose. Prolia is still in the process of being approved.    [phone]

## 2018-05-03 ENCOUNTER — TELEPHONE (OUTPATIENT)
Dept: ORTHOPEDIC SURGERY | Facility: CLINIC | Age: 63
End: 2018-05-03

## 2018-05-03 NOTE — TELEPHONE ENCOUNTER
I called and spoke with patient about her CT scan.  I reviewed with her that it does show several areas of osseous bridging although does not appear to be completely healed yet there is still some fracture plane evident.    About 1 month ago she increased her vitamin D from 1200 units daily to 1800 units daily and she will continue as such.  Her previous level around 2 months ago was around 37.    I reviewed with her that at this point I would not plan on surgical treatment and she is in agreement with that and would prefer to avoid surgery.    She will continue in her postoperative shoe and I'll see her back in the next several weeks for repeat examination

## 2018-05-16 ENCOUNTER — OFFICE VISIT (OUTPATIENT)
Dept: ORTHOPEDIC SURGERY | Facility: CLINIC | Age: 63
End: 2018-05-16

## 2018-05-16 VITALS — WEIGHT: 98 LBS | TEMPERATURE: 97.1 F | HEIGHT: 61 IN | BODY MASS INDEX: 18.5 KG/M2

## 2018-05-16 DIAGNOSIS — S92.322D CLOSED DISPLACED FRACTURE OF SECOND METATARSAL BONE OF LEFT FOOT WITH ROUTINE HEALING, SUBSEQUENT ENCOUNTER: Primary | ICD-10-CM

## 2018-05-16 PROCEDURE — 99024 POSTOP FOLLOW-UP VISIT: CPT | Performed by: ORTHOPAEDIC SURGERY

## 2018-05-16 NOTE — PROGRESS NOTES
"Foot Follow Up      Patient: Violeta Neff    YOB: 1955 62 y.o. female    Chief Complaints: Foot feeling better    History of Present Illness: Patient was seen initially on 4/20/18 with left second metatarsal stress fracture that had begun when she was getting increased standing and walking taking care of her mother and then had onset of worsening pain when getting out of her bed around 2/3/18.    At her last visit I recommended that she use her postoperative shoe which she did not have a lot of the previous visit which she is now been doing.  For 4 weeks now she is also increased her vitamin D to 1800 units daily rather than 1200 units.    At her last visit she was sent for CT scan and she follows up today to review that and states that her foot is feeling quite a bit better with only some slight occasional achiness  HPI    ROS: Foot pain  History reviewed. No pertinent past medical history.  Physical Exam:   Vitals:    05/16/18 0827   Temp: 97.1 °F (36.2 °C)   Weight: 44.5 kg (98 lb)   Height: 154.9 cm (61\")     Well developed with normal mood.Nonantalgic gait.  Left foot shows only slight swelling no warmth erythema and minimal discomfort along the second metatarsal.  Neutral dorsiflexion a heel cord      Radiology: CT scan films and report of the left foot dated 5/1/18 reviewed which show new periosteal bone around second metatarsal fracture with areas of osseous healing around the plantar medial and dorsomedial sides as well as new bone along the plantar lateral edge of the bone.  No evidence of underlying bone lesion      Assessment/Plan:  1.  Left second metatarsal stress fracture    Overall she seems be improving.  She'll continue with her postoperative shoe for full-time use now may begin some gentle heel cord stretching exercises.  She'll continue with vitamin D 1800 mg daily    I will see her back in 3 weeks' x-rays of her left foot  "

## 2018-05-16 NOTE — PROGRESS NOTES
"Foot Follow Up      Patient: Violeta Neff    YOB: 1955 62 y.o. female    Chief Complaints: Foot pain    History of Present Illness:  HPI    ROS: Foot pain  History reviewed. No pertinent past medical history.  Physical Exam:   Vitals:    05/16/18 0827   Temp: 97.1 °F (36.2 °C)   Weight: 44.5 kg (98 lb)   Height: 154.9 cm (61\")     Well developed with normal mood.      Radiology:      Assessment/Plan:    "

## 2018-05-31 ENCOUNTER — TELEPHONE (OUTPATIENT)
Dept: FAMILY MEDICINE CLINIC | Facility: CLINIC | Age: 63
End: 2018-05-31

## 2018-05-31 NOTE — TELEPHONE ENCOUNTER
I rec'd pt's prolia serum this morning. She is scheduled for her inj on Monday morning    Pt would like to know if she should continue taking Fosamax? When should she discontinue use?    Please advise.

## 2018-06-04 ENCOUNTER — CLINICAL SUPPORT (OUTPATIENT)
Dept: FAMILY MEDICINE CLINIC | Facility: CLINIC | Age: 63
End: 2018-06-04

## 2018-06-04 DIAGNOSIS — M80.00XD AGE-RELATED OSTEOPOROSIS WITH CURRENT PATHOLOGICAL FRACTURE WITH ROUTINE HEALING, SUBSEQUENT ENCOUNTER: Primary | ICD-10-CM

## 2018-06-04 PROCEDURE — 96372 THER/PROPH/DIAG INJ SC/IM: CPT | Performed by: FAMILY MEDICINE

## 2018-06-05 NOTE — PROGRESS NOTES
Foot Follow Up      Patient: Violeta Neff    YOB: 1955 62 y.o. female    Chief Complaints: Foot pain    History of Present Illness:Patient was seen initially on 4/20/18 with left second metatarsal stress fracture that had begun when she was getting increased standing and walking taking care of her mother and then had onset of worsening pain when getting out of her bed around 2/3/18.    She was last seen on 5/16/18 and has not been taking 1800 units of vitamin D daily for the last 7 weeks.  Instructions were given at last visit to continue with postoperative shoe and begin gentle heel cord stretching exercises.  She's not really been doing any stretching exercises and has been using the postoperative shoe with no complaints of pain in the left foot  HPI    ROS: No Foot pain  No past medical history on file.  Physical Exam:   There were no vitals filed for this visit.  Well developed with normal mood.  Left foot shows palpable callus around the second metatarsal but no warmth erythema or tenderness to palpation or manipulation of the second metatarsal      Radiology: 3 views left foot ordered to evaluate second metatarsal stress fracture reviewed and compared with previous x-rays that shows copious increase in callus formation around the second metatarsal fracture without change in alignment      Assessment/Plan:  Left second metatarsal stress fracture    Overall she seems be markedly improved she may start weaning out of her postoperative shoe into accommodative sturdy athletic shoes as pain allows.    I demonstrated heel cord stretching exercises for her to do at least 3 or 4 times a day.  Instruction she was provided and discussed etiology of heel cord stretching to forefoot overload.    Regarding her vitamin D she'll continue on current dosage and understands to check with her primary care physician regarding further dosing and monitoring of this.    We had a pleasant visit I will see her back as  needed

## 2018-06-06 ENCOUNTER — OFFICE VISIT (OUTPATIENT)
Dept: ORTHOPEDIC SURGERY | Facility: CLINIC | Age: 63
End: 2018-06-06

## 2018-06-06 VITALS — BODY MASS INDEX: 18.54 KG/M2 | TEMPERATURE: 97.4 F | HEIGHT: 61 IN | WEIGHT: 98.2 LBS

## 2018-06-06 DIAGNOSIS — S92.322D CLOSED DISPLACED FRACTURE OF SECOND METATARSAL BONE OF LEFT FOOT WITH ROUTINE HEALING, SUBSEQUENT ENCOUNTER: Primary | ICD-10-CM

## 2018-06-06 PROCEDURE — 73630 X-RAY EXAM OF FOOT: CPT | Performed by: ORTHOPAEDIC SURGERY

## 2018-06-06 PROCEDURE — 99024 POSTOP FOLLOW-UP VISIT: CPT | Performed by: ORTHOPAEDIC SURGERY

## 2018-12-07 ENCOUNTER — CLINICAL SUPPORT (OUTPATIENT)
Dept: FAMILY MEDICINE CLINIC | Facility: CLINIC | Age: 63
End: 2018-12-07

## 2018-12-07 DIAGNOSIS — M81.0 OSTEOPOROSIS, UNSPECIFIED OSTEOPOROSIS TYPE, UNSPECIFIED PATHOLOGICAL FRACTURE PRESENCE: Primary | ICD-10-CM

## 2018-12-07 PROCEDURE — 96372 THER/PROPH/DIAG INJ SC/IM: CPT | Performed by: FAMILY MEDICINE

## 2019-03-25 ENCOUNTER — OFFICE VISIT (OUTPATIENT)
Dept: FAMILY MEDICINE CLINIC | Facility: CLINIC | Age: 64
End: 2019-03-25

## 2019-03-25 VITALS
HEIGHT: 61 IN | HEART RATE: 84 BPM | DIASTOLIC BLOOD PRESSURE: 68 MMHG | BODY MASS INDEX: 19.03 KG/M2 | WEIGHT: 100.8 LBS | SYSTOLIC BLOOD PRESSURE: 122 MMHG | OXYGEN SATURATION: 100 %

## 2019-03-25 DIAGNOSIS — L81.4 SOLAR LENTIGO: Primary | ICD-10-CM

## 2019-03-25 PROCEDURE — 99213 OFFICE O/P EST LOW 20 MIN: CPT | Performed by: FAMILY MEDICINE

## 2019-03-25 NOTE — PROGRESS NOTES
Subjective   Violeta Neff is a 63 y.o. female with   Chief Complaint   Patient presents with   • Skin Problem     Spot on Face   .    History of Present Illness     64 yo white female who presents today for evaluation of an area of skin on the left side of her face near the ear.  She states she had some significant sunburn's when she was younger.  Pt states she does have family history of squamous cell carcinoma and melanoma.  Pt currently is without further complaints.    The following portions of the patient's history were reviewed and updated as appropriate: allergies, current medications, past family history, past medical history, past social history, past surgical history and problem list.    Review of Systems   Skin: Positive for color change (Left side of Face).   All other systems reviewed and are negative.      Objective     Vitals:    03/25/19 1346   BP: 122/68   Pulse: 84   SpO2: 100%     BP Readings from Last 3 Encounters:   03/25/19 122/68   03/29/18 122/64   03/07/18 120/66      Wt Readings from Last 3 Encounters:   03/25/19 45.7 kg (100 lb 12.8 oz)   06/06/18 44.5 kg (98 lb 3.2 oz)   05/16/18 44.5 kg (98 lb)        No results found for this or any previous visit (from the past 168 hour(s)).    Physical Exam   Constitutional: She is oriented to person, place, and time. Vital signs are normal. She appears well-developed and well-nourished. She is cooperative.   HENT:   Head: Normocephalic and atraumatic.   Neck: Neck supple.   Neurological: She is alert and oriented to person, place, and time.   Skin: Skin is warm and dry. Lesion and rash noted. Rash is macular.   Small tan area of the left side of her face close to the ear.  Well-circumscribed approximately 3-4 mm.   Psychiatric: She has a normal mood and affect. Her speech is normal and behavior is normal. Judgment and thought content normal. Cognition and memory are normal.   Nursing note and vitals reviewed.      Assessment/Plan   Violeta was seen  today for skin problem.    Diagnoses and all orders for this visit:    Solar lentigo      Return if symptoms worsen or fail to improve.    Scribed for Baudilio Nicole MD by Kiera Jensen CMA. 03/25/2019    I, Baudilio Nicole MD personally performed the services described in this documentation, as scribed by Kiera Jensen CMA in my presence, and it is both accurate and complete

## 2019-05-08 ENCOUNTER — TELEPHONE (OUTPATIENT)
Dept: FAMILY MEDICINE CLINIC | Facility: CLINIC | Age: 64
End: 2019-05-08

## 2019-05-08 DIAGNOSIS — M80.00XD AGE-RELATED OSTEOPOROSIS WITH CURRENT PATHOLOGICAL FRACTURE WITH ROUTINE HEALING, SUBSEQUENT ENCOUNTER: Primary | ICD-10-CM

## 2019-05-13 ENCOUNTER — TELEPHONE (OUTPATIENT)
Dept: FAMILY MEDICINE CLINIC | Facility: CLINIC | Age: 64
End: 2019-05-13

## 2019-05-14 DIAGNOSIS — M80.00XD AGE-RELATED OSTEOPOROSIS WITH CURRENT PATHOLOGICAL FRACTURE WITH ROUTINE HEALING, SUBSEQUENT ENCOUNTER: Primary | ICD-10-CM

## 2019-06-07 ENCOUNTER — APPOINTMENT (OUTPATIENT)
Dept: BONE DENSITY | Facility: HOSPITAL | Age: 64
End: 2019-06-07

## 2019-06-07 PROCEDURE — 77080 DXA BONE DENSITY AXIAL: CPT

## 2019-06-10 ENCOUNTER — HOSPITAL ENCOUNTER (OUTPATIENT)
Dept: INFUSION THERAPY | Facility: HOSPITAL | Age: 64
Discharge: HOME | End: 2019-06-10

## 2019-06-17 ENCOUNTER — HOSPITAL ENCOUNTER (OUTPATIENT)
Dept: INFUSION THERAPY | Facility: HOSPITAL | Age: 64
Discharge: HOME | End: 2019-06-17
Admitting: FAMILY MEDICINE

## 2019-06-17 VITALS
DIASTOLIC BLOOD PRESSURE: 65 MMHG | SYSTOLIC BLOOD PRESSURE: 108 MMHG | HEIGHT: 61 IN | TEMPERATURE: 97.9 F | OXYGEN SATURATION: 96 % | WEIGHT: 100 LBS | RESPIRATION RATE: 16 BRPM | BODY MASS INDEX: 18.88 KG/M2 | HEART RATE: 72 BPM

## 2019-06-17 DIAGNOSIS — M80.00XD OSTEOPOROSIS WITH PATHOLOGICAL FRACTURE, WITH ROUTINE HEALING, SUBSEQUENT ENCOUNTER: Primary | ICD-10-CM

## 2019-06-17 DIAGNOSIS — M80.00XD AGE-RELATED OSTEOPOROSIS WITH CURRENT PATHOLOGICAL FRACTURE WITH ROUTINE HEALING, SUBSEQUENT ENCOUNTER: ICD-10-CM

## 2019-06-17 LAB
CALCIUM SPEC-SCNC: 8.8 MG/DL (ref 8.6–10.5)
MAGNESIUM SERPL-MCNC: 2 MG/DL (ref 1.6–2.4)
PHOSPHATE SERPL-MCNC: 3 MG/DL (ref 2.5–4.5)

## 2019-06-17 PROCEDURE — 83735 ASSAY OF MAGNESIUM: CPT | Performed by: FAMILY MEDICINE

## 2019-06-17 PROCEDURE — 96372 THER/PROPH/DIAG INJ SC/IM: CPT

## 2019-06-17 PROCEDURE — 82310 ASSAY OF CALCIUM: CPT | Performed by: FAMILY MEDICINE

## 2019-06-17 PROCEDURE — 84100 ASSAY OF PHOSPHORUS: CPT | Performed by: FAMILY MEDICINE

## 2019-06-17 PROCEDURE — 25010000002 DENOSUMAB 60 MG/ML SOLUTION PREFILLED SYRINGE: Performed by: FAMILY MEDICINE

## 2019-06-17 RX ORDER — IBUPROFEN 400 MG/1
400 TABLET ORAL EVERY 6 HOURS PRN
COMMUNITY

## 2019-06-17 RX ADMIN — DENOSUMAB 60 MG: 60 INJECTION SUBCUTANEOUS at 10:31

## 2019-06-17 NOTE — NURSING NOTE
NURSING PROGRESS NOTE      1000  Pt to [facility] per self..Pt ID verified by (2) identifiers. Allergies, medications and medical history reviewed and verified. Tolerated prescribed treatment without incident. Patient received AVS, Pt verbalized understanding.  Discharged to home @ 1045. Condition Satisfactory .  Светлана Melissa RN

## 2019-06-17 NOTE — PATIENT INSTRUCTIONS
"  Call [facility] at [phone] if you have any problems or concerns.    We know you have a Choice in healthcare and appreciate you using [facility].  Our purpose is to provide you \"Excellent Care\".  We hope that you will always choose us in the future and continue to recommend us to your family and friends.            Denosumab injection  What is this medicine?  DENOSUMAB (den oh mariluz mab) slows bone breakdown. Prolia is used to treat osteoporosis in women after menopause and in men, and in people who are taking corticosteroids for 6 months or more. Xgeva is used to treat a high calcium level due to cancer and to prevent bone fractures and other bone problems caused by multiple myeloma or cancer bone metastases. Xgeva is also used to treat giant cell tumor of the bone.  This medicine may be used for other purposes; ask your health care provider or pharmacist if you have questions.  COMMON BRAND NAME(S): Prolia, XGEVA  What should I tell my health care provider before I take this medicine?  They need to know if you have any of these conditions:  -dental disease  -having surgery or tooth extraction  -infection  -kidney disease  -low levels of calcium or Vitamin D in the blood  -malnutrition  -on hemodialysis  -skin conditions or sensitivity  -thyroid or parathyroid disease  -an unusual reaction to denosumab, other medicines, foods, dyes, or preservatives  -pregnant or trying to get pregnant  -breast-feeding  How should I use this medicine?  This medicine is for injection under the skin. It is given by a health care professional in a hospital or clinic setting.  If you are getting Prolia, a special MedGuide will be given to you by the pharmacist with each prescription and refill. Be sure to read this information carefully each time.  For Prolia, talk to your pediatrician regarding the use of this medicine in children. Special care may be needed. For Xgeva, talk to your " pediatrician regarding the use of this medicine in children. While this drug may be prescribed for children as young as 13 years for selected conditions, precautions do apply.  Overdosage: If you think you have taken too much of this medicine contact a poison control center or emergency room at once.  NOTE: This medicine is only for you. Do not share this medicine with others.  What if I miss a dose?  It is important not to miss your dose. Call your doctor or health care professional if you are unable to keep an appointment.  What may interact with this medicine?  Do not take this medicine with any of the following medications:  -other medicines containing denosumab  This medicine may also interact with the following medications:  -medicines that lower your chance of fighting infection  -steroid medicines like prednisone or cortisone  This list may not describe all possible interactions. Give your health care provider a list of all the medicines, herbs, non-prescription drugs, or dietary supplements you use. Also tell them if you smoke, drink alcohol, or use illegal drugs. Some items may interact with your medicine.  What should I watch for while using this medicine?  Visit your doctor or health care professional for regular checks on your progress. Your doctor or health care professional may order blood tests and other tests to see how you are doing.  Call your doctor or health care professional for advice if you get a fever, chills or sore throat, or other symptoms of a cold or flu. Do not treat yourself. This drug may decrease your body's ability to fight infection. Try to avoid being around people who are sick.  You should make sure you get enough calcium and vitamin D while you are taking this medicine, unless your doctor tells you not to. Discuss the foods you eat and the vitamins you take with your health care professional.  See your dentist regularly. Brush and floss your teeth as directed. Before you have  any dental work done, tell your dentist you are receiving this medicine.  Do not become pregnant while taking this medicine or for 5 months after stopping it. Talk with your doctor or health care professional about your birth control options while taking this medicine. Women should inform their doctor if they wish to become pregnant or think they might be pregnant. There is a potential for serious side effects to an unborn child. Talk to your health care professional or pharmacist for more information.  What side effects may I notice from receiving this medicine?  Side effects that you should report to your doctor or health care professional as soon as possible:  -allergic reactions like skin rash, itching or hives, swelling of the face, lips, or tongue  -bone pain  -breathing problems  -dizziness  -jaw pain, especially after dental work  -redness, blistering, peeling of the skin  -signs and symptoms of infection like fever or chills; cough; sore throat; pain or trouble passing urine  -signs of low calcium like fast heartbeat, muscle cramps or muscle pain; pain, tingling, numbness in the hands or feet; seizures  -unusual bleeding or bruising  -unusually weak or tired  Side effects that usually do not require medical attention (report to your doctor or health care professional if they continue or are bothersome):  -constipation  -diarrhea  -headache  -joint pain  -loss of appetite  -muscle pain  -runny nose  -tiredness  -upset stomach  This list may not describe all possible side effects. Call your doctor for medical advice about side effects. You may report side effects to FDA at 8-881-FDA-8905.  Where should I keep my medicine?  This medicine is only given in a clinic, doctor's office, or other health care setting and will not be stored at home.  NOTE: This sheet is a summary. It may not cover all possible information. If you have questions about this medicine, talk to your doctor, pharmacist, or health care  provider.  © 2019 [employer] (2018-05-23 14:50:05)

## 2019-12-18 ENCOUNTER — HOSPITAL ENCOUNTER (OUTPATIENT)
Dept: INFUSION THERAPY | Facility: HOSPITAL | Age: 64
Discharge: HOME | End: 2019-12-18
Admitting: FAMILY MEDICINE

## 2019-12-18 VITALS
HEIGHT: 61 IN | WEIGHT: 100 LBS | OXYGEN SATURATION: 99 % | SYSTOLIC BLOOD PRESSURE: 107 MMHG | DIASTOLIC BLOOD PRESSURE: 60 MMHG | TEMPERATURE: 98.4 F | BODY MASS INDEX: 18.88 KG/M2 | RESPIRATION RATE: 16 BRPM | HEART RATE: 70 BPM

## 2019-12-18 DIAGNOSIS — M80.00XD OSTEOPOROSIS WITH PATHOLOGICAL FRACTURE, WITH ROUTINE HEALING, SUBSEQUENT ENCOUNTER: Primary | ICD-10-CM

## 2019-12-18 DIAGNOSIS — M80.00XD AGE-RELATED OSTEOPOROSIS WITH CURRENT PATHOLOGICAL FRACTURE WITH ROUTINE HEALING, SUBSEQUENT ENCOUNTER: ICD-10-CM

## 2019-12-18 LAB
CALCIUM SPEC-SCNC: 9 MG/DL (ref 8.6–10.5)
MAGNESIUM SERPL-MCNC: 2 MG/DL (ref 1.6–2.4)
PHOSPHATE SERPL-MCNC: 3 MG/DL (ref 2.5–4.5)

## 2019-12-18 PROCEDURE — 25010000002 DENOSUMAB 60 MG/ML SOLUTION PREFILLED SYRINGE

## 2019-12-18 PROCEDURE — 96372 THER/PROPH/DIAG INJ SC/IM: CPT

## 2019-12-18 PROCEDURE — 82310 ASSAY OF CALCIUM: CPT | Performed by: FAMILY MEDICINE

## 2019-12-18 PROCEDURE — 83735 ASSAY OF MAGNESIUM: CPT | Performed by: FAMILY MEDICINE

## 2019-12-18 PROCEDURE — 84100 ASSAY OF PHOSPHORUS: CPT | Performed by: FAMILY MEDICINE

## 2019-12-18 RX ADMIN — DENOSUMAB 60 MG: 60 INJECTION SUBCUTANEOUS at 11:43

## 2019-12-18 NOTE — PATIENT INSTRUCTIONS
"  Call Dr. Nito Barrett, [facility] [place] at [phone] if you have any problems or concerns.    We know you have a Choice in healthcare and appreciate you using [facility].  Our purpose is to provide you \"Excellent Care\".  We hope that you will always choose us in the future and continue to recommend us to your family and friends.            Denosumab injection  What is this medicine?  DENOSUMAB (den oh mariluz mab) slows bone breakdown. Prolia is used to treat osteoporosis in women after menopause and in men, and in people who are taking corticosteroids for 6 months or more. Xgeva is used to treat a high calcium level due to cancer and to prevent bone fractures and other bone problems caused by multiple myeloma or cancer bone metastases. Xgeva is also used to treat giant cell tumor of the bone.  This medicine may be used for other purposes; ask your health care provider or pharmacist if you have questions.  COMMON BRAND NAME(S): Prolia, XGEVA  What should I tell my health care provider before I take this medicine?  They need to know if you have any of these conditions:  -dental disease  -having surgery or tooth extraction  -infection  -kidney disease  -low levels of calcium or Vitamin D in the blood  -malnutrition  -on hemodialysis  -skin conditions or sensitivity  -thyroid or parathyroid disease  -an unusual reaction to denosumab, other medicines, foods, dyes, or preservatives  -pregnant or trying to get pregnant  -breast-feeding  How should I use this medicine?  This medicine is for injection under the skin. It is given by a health care professional in a hospital or clinic setting.  A special MedGuide will be given to you before each treatment. Be sure to read this information carefully each time.  For Prolia, talk to your pediatrician regarding the use of this medicine in children. Special care may be needed. For Xgeva, talk to your pediatrician regarding the use of this " medicine in children. While this drug may be prescribed for children as young as 13 years for selected conditions, precautions do apply.  Overdosage: If you think you have taken too much of this medicine contact a poison control center or emergency room at once.  NOTE: This medicine is only for you. Do not share this medicine with others.  What if I miss a dose?  It is important not to miss your dose. Call your doctor or health care professional if you are unable to keep an appointment.  What may interact with this medicine?  Do not take this medicine with any of the following medications:  -other medicines containing denosumab  This medicine may also interact with the following medications:  -medicines that lower your chance of fighting infection  -steroid medicines like prednisone or cortisone  This list may not describe all possible interactions. Give your health care provider a list of all the medicines, herbs, non-prescription drugs, or dietary supplements you use. Also tell them if you smoke, drink alcohol, or use illegal drugs. Some items may interact with your medicine.  What should I watch for while using this medicine?  Visit your doctor or health care professional for regular checks on your progress. Your doctor or health care professional may order blood tests and other tests to see how you are doing.  Call your doctor or health care professional for advice if you get a fever, chills or sore throat, or other symptoms of a cold or flu. Do not treat yourself. This drug may decrease your body's ability to fight infection. Try to avoid being around people who are sick.  You should make sure you get enough calcium and vitamin D while you are taking this medicine, unless your doctor tells you not to. Discuss the foods you eat and the vitamins you take with your health care professional.  See your dentist regularly. Brush and floss your teeth as directed. Before you have any dental work done, tell your dentist  you are receiving this medicine.  Do not become pregnant while taking this medicine or for 5 months after stopping it. Talk with your doctor or health care professional about your birth control options while taking this medicine. Women should inform their doctor if they wish to become pregnant or think they might be pregnant. There is a potential for serious side effects to an unborn child. Talk to your health care professional or pharmacist for more information.  What side effects may I notice from receiving this medicine?  Side effects that you should report to your doctor or health care professional as soon as possible:  -allergic reactions like skin rash, itching or hives, swelling of the face, lips, or tongue  -bone pain  -breathing problems  -dizziness  -jaw pain, especially after dental work  -redness, blistering, peeling of the skin  -signs and symptoms of infection like fever or chills; cough; sore throat; pain or trouble passing urine  -signs of low calcium like fast heartbeat, muscle cramps or muscle pain; pain, tingling, numbness in the hands or feet; seizures  -unusual bleeding or bruising  -unusually weak or tired  Side effects that usually do not require medical attention (report to your doctor or health care professional if they continue or are bothersome):  -constipation  -diarrhea  -headache  -joint pain  -loss of appetite  -muscle pain  -runny nose  -tiredness  -upset stomach  This list may not describe all possible side effects. Call your doctor for medical advice about side effects. You may report side effects to FDA at 7-597-FDA-9838.  Where should I keep my medicine?  This medicine is only given in a clinic, doctor's office, or other health care setting and will not be stored at home.  NOTE: This sheet is a summary. It may not cover all possible information. If you have questions about this medicine, talk to your doctor, pharmacist, or health care provider.  © 2019 [employer]/Gold Standard  (2019-04-26 16:10:44)

## 2019-12-18 NOTE — NURSING NOTE
NURSING PROGRESS NOTE       Tolerated prescribed treatment without incident. Patient received AVS, Pt verbalized understanding.  Discharged to home. Condition Satisfactory .  Светлана Melissa RN

## 2019-12-18 NOTE — NURSING NOTE
NURSING PROGRESS NOTE      1058 Pt to [facility] per ambulatory .Pt ID verified by (2) identifiers. Allergies, medications and medical history reviewed and verified.Светлана Melissa RN

## 2020-02-13 DIAGNOSIS — E78.2 MIXED HYPERLIPIDEMIA: Primary | ICD-10-CM

## 2020-03-06 LAB
ALBUMIN SERPL-MCNC: 4.2 G/DL (ref 3.5–5.2)
ALBUMIN/GLOB SERPL: 1.8 G/DL
ALP SERPL-CCNC: 83 U/L (ref 39–117)
ALT SERPL-CCNC: 21 U/L (ref 1–33)
AST SERPL-CCNC: 12 U/L (ref 1–32)
BASOPHILS # BLD AUTO: 0.07 10*3/MM3 (ref 0–0.2)
BASOPHILS NFR BLD AUTO: 0.6 % (ref 0–1.5)
BILIRUB SERPL-MCNC: 0.4 MG/DL (ref 0.2–1.2)
BUN SERPL-MCNC: 13 MG/DL (ref 8–23)
BUN/CREAT SERPL: 14.1 (ref 7–25)
CALCIUM SERPL-MCNC: 9.2 MG/DL (ref 8.6–10.5)
CHLORIDE SERPL-SCNC: 101 MMOL/L (ref 98–107)
CHOLEST SERPL-MCNC: 182 MG/DL (ref 0–200)
CO2 SERPL-SCNC: 28 MMOL/L (ref 22–29)
CREAT SERPL-MCNC: 0.92 MG/DL (ref 0.57–1)
EOSINOPHIL # BLD AUTO: 0.62 10*3/MM3 (ref 0–0.4)
EOSINOPHIL NFR BLD AUTO: 5.4 % (ref 0.3–6.2)
ERYTHROCYTE [DISTWIDTH] IN BLOOD BY AUTOMATED COUNT: 12.8 % (ref 12.3–15.4)
GLOBULIN SER CALC-MCNC: 2.4 GM/DL
GLUCOSE SERPL-MCNC: 87 MG/DL (ref 65–99)
HCT VFR BLD AUTO: 38.6 % (ref 34–46.6)
HDLC SERPL-MCNC: 66 MG/DL (ref 40–60)
HGB BLD-MCNC: 12.9 G/DL (ref 12–15.9)
IMM GRANULOCYTES # BLD AUTO: 0.05 10*3/MM3 (ref 0–0.05)
IMM GRANULOCYTES NFR BLD AUTO: 0.4 % (ref 0–0.5)
LDLC SERPL CALC-MCNC: 103 MG/DL (ref 0–100)
LYMPHOCYTES # BLD AUTO: 2.07 10*3/MM3 (ref 0.7–3.1)
LYMPHOCYTES NFR BLD AUTO: 18.2 % (ref 19.6–45.3)
MCH RBC QN AUTO: 30.1 PG (ref 26.6–33)
MCHC RBC AUTO-ENTMCNC: 33.4 G/DL (ref 31.5–35.7)
MCV RBC AUTO: 90 FL (ref 79–97)
MONOCYTES # BLD AUTO: 0.89 10*3/MM3 (ref 0.1–0.9)
MONOCYTES NFR BLD AUTO: 7.8 % (ref 5–12)
NEUTROPHILS # BLD AUTO: 7.7 10*3/MM3 (ref 1.7–7)
NEUTROPHILS NFR BLD AUTO: 67.6 % (ref 42.7–76)
NRBC BLD AUTO-RTO: 0.1 /100 WBC (ref 0–0.2)
PLATELET # BLD AUTO: 233 10*3/MM3 (ref 140–450)
POTASSIUM SERPL-SCNC: 4.3 MMOL/L (ref 3.5–5.2)
PROT SERPL-MCNC: 6.6 G/DL (ref 6–8.5)
RBC # BLD AUTO: 4.29 10*6/MM3 (ref 3.77–5.28)
SODIUM SERPL-SCNC: 141 MMOL/L (ref 136–145)
TRIGL SERPL-MCNC: 67 MG/DL (ref 0–150)
TSH SERPL DL<=0.005 MIU/L-ACNC: 2.1 UIU/ML (ref 0.27–4.2)
VLDLC SERPL CALC-MCNC: 13.4 MG/DL
WBC # BLD AUTO: 11.4 10*3/MM3 (ref 3.4–10.8)

## 2020-03-12 ENCOUNTER — OFFICE VISIT (OUTPATIENT)
Dept: FAMILY MEDICINE CLINIC | Facility: CLINIC | Age: 65
End: 2020-03-12

## 2020-03-12 VITALS
WEIGHT: 102 LBS | SYSTOLIC BLOOD PRESSURE: 116 MMHG | OXYGEN SATURATION: 98 % | HEART RATE: 68 BPM | DIASTOLIC BLOOD PRESSURE: 68 MMHG | BODY MASS INDEX: 19.26 KG/M2 | HEIGHT: 61 IN

## 2020-03-12 DIAGNOSIS — F17.210 CIGARETTE NICOTINE DEPENDENCE WITHOUT COMPLICATION: ICD-10-CM

## 2020-03-12 DIAGNOSIS — M81.0 AGE-RELATED OSTEOPOROSIS WITHOUT CURRENT PATHOLOGICAL FRACTURE: ICD-10-CM

## 2020-03-12 DIAGNOSIS — Z00.00 MEDICARE ANNUAL WELLNESS VISIT, INITIAL: Primary | ICD-10-CM

## 2020-03-12 DIAGNOSIS — D72.829 LEUKOCYTOSIS, UNSPECIFIED TYPE: ICD-10-CM

## 2020-03-12 DIAGNOSIS — Z12.31 ENCOUNTER FOR SCREENING MAMMOGRAM FOR MALIGNANT NEOPLASM OF BREAST: ICD-10-CM

## 2020-03-12 DIAGNOSIS — Z12.11 COLON CANCER SCREENING: ICD-10-CM

## 2020-03-12 DIAGNOSIS — E78.2 MIXED HYPERLIPIDEMIA: ICD-10-CM

## 2020-03-12 DIAGNOSIS — Z85.038 ENCOUNTER FOR FOLLOW-UP SURVEILLANCE OF COLON CANCER: ICD-10-CM

## 2020-03-12 DIAGNOSIS — Z08 ENCOUNTER FOR FOLLOW-UP SURVEILLANCE OF COLON CANCER: ICD-10-CM

## 2020-03-12 PROCEDURE — G0438 PPPS, INITIAL VISIT: HCPCS | Performed by: FAMILY MEDICINE

## 2020-03-12 PROCEDURE — 99406 BEHAV CHNG SMOKING 3-10 MIN: CPT | Performed by: FAMILY MEDICINE

## 2020-03-12 PROCEDURE — 99213 OFFICE O/P EST LOW 20 MIN: CPT | Performed by: FAMILY MEDICINE

## 2020-03-12 NOTE — PROGRESS NOTES
QUICK REFERENCE INFORMATION:  The ABCs of Providing the Welcome to Medicare Wellness Visit   CMS.[facility]    Welcome to Medicare Visit    Subjective   History of Present Illness    Violeta Neff is a 64 y.o. female an established patient presenting for a Welcome to Medicare Visit. In addition, we addressed the following health issues: 64-year-old white female with first adult wellness visit also to follow-up for past medical issues including hyperlipidemia and osteoporosis.  Patient has been trying to manage her lipid status with diet measures alone.  She is also using Prolia on an every 6-month basis.  Patient's last DEXA scan was in May 2019 with a left femoral neck T score of -2.9 and a T score of the lumbar spine of -2.1.  This did show improvement with Prolia at this patient will be continued.  Last mammogram more than 2 years ago and this will be arranged for this date.  Patient has never had a colonoscopy and is unwilling to do so.  She would be willing to try Cologuard.  Patient admits that she still smokes.  She is using half a pack of cigarettes per day.      PMH, PSH, SocHx, FamHx, Allergies, and Medications: Reviewed and updated in the Visit Navigator.     Outpatient Medications Prior to Visit   Medication Sig Dispense Refill   • Calcium Carbonate-Vitamin D (CALTRATE 600+D PO) Take  by mouth 3 (Three) Times a Day.     • denosumab (PROLIA) 60 MG/ML solution syringe Inject  under the skin into the appropriate area as directed 1 (One) Time.     • ibuprofen (ADVIL,MOTRIN) 400 MG tablet Take 400 mg by mouth Every 6 (Six) Hours As Needed for Mild Pain .       No facility-administered medications prior to visit.        Patient Active Problem List   Diagnosis   • Age-related osteoporosis without current pathological fracture   • Mixed hyperlipidemia   • Closed displaced fracture of second metatarsal bone of left foot   • Leukocytosis   • Cigarette nicotine dependence without complication        Health Habits:  Dental Exam. up to date  Eye Exam. up to date  Exercise: 4 times/week.  Current exercise activities include: walking.    Social:  See review in SnapShot activity and in SocHx section of Visit Navigator.    Health Risk Assessment:  The patient has completed a Health Risk Assessment. This has been reviewed with them and has been scanned into OnBase as a separate document.    Current Medical Providers:  Patient Care Team:  Baudilio Nicole DO as PCP - General (Family Medicine)    The [facility] providers who are involved in the care of this patient are listed above. Additional providers and suppliers are listed below:  none    Recent Hospitalizations:  No recent hospitalization(s)..     Age-appropriate Screening Schedule:  Refer to the list below for future screening recommendations based on patient's age. Orders for these recommended tests are listed in the plan section. The patient has been provided with a written plan.    Health Maintenance   Topic Date Due   • MAMMOGRAM  02/03/2019   • HEPATITIS C SCREENING  03/15/2020 (Originally 1/23/2017)   • COLONOSCOPY  03/15/2020 (Originally 1/23/2017)   • TDAP/TD VACCINES (1 - Tdap) 03/15/2020 (Originally 11/27/1966)   • Pneumococcal Vaccine Once at 65 Years Old  06/09/2020 (Originally 11/27/2019)   • ZOSTER VACCINE (2 of 3) 03/12/2021 (Originally 9/22/2017)   • PAP SMEAR  04/12/2020   • LIPID PANEL  03/05/2021   • MEDICARE ANNUAL WELLNESS  03/12/2021   • DXA SCAN  06/07/2021   • INFLUENZA VACCINE  Completed       Depression Screen:   PHQ-2/PHQ-9 Depression Screening 3/12/2020   Little interest or pleasure in doing things 0   Feeling down, depressed, or hopeless 0   Total Score 0       Functional and Cognitive Screening:  Functional & Cognitive Status 3/12/2020   Do you have difficulty preparing food and eating? No   Do you have difficulty bathing yourself, getting dressed or grooming yourself? No   Do you have difficulty using the toilet? No   Do  you have difficulty moving around from place to place? No   Do you have trouble with steps or getting out of a bed or a chair? No   Current Diet Well Balanced Diet   Dental Exam Up to date   Eye Exam Up to date   Exercise (times per week) 4 times per week   Current Exercise Activities Include Walking   Do you need help using the phone?  No   Are you deaf or do you have serious difficulty hearing?  No   Do you need help with transportation? No   Do you need help shopping? No   Do you need help preparing meals?  No   Do you need help with housework?  No   Do you need help with laundry? No   Do you need help taking your medications? No   Do you need help managing money? No   Do you ever drive or ride in a car without wearing a seat belt? No   Have you felt unusual stress, anger or loneliness in the last month? No   Who do you live with? Spouse   If you need help, do you have trouble finding someone available to you? No   Do you have difficulty concentrating, remembering or making decisions? No       Does the patient have evidence of cognitive impairment? No    Advanced Care Planning:  ACP discussion was held with the patient during this visit. Patient does not have an advance directive, information provided.    Identification of Risk Factors:  Risk factors include: Advance Directive Discussion  Breast Cancer/Mammogram Screening  Cardiovascular risk  Colon Cancer Screening  Dementia/Memory   Depression/Dysphoria  Diabetic Lab Screening   Fall Risk  Glaucoma Risk  Immunizations Discussed/Encouraged (specific immunizations; adacel Tdap, Pneumococcal 23 and Shingrix )  Inactivity/Sedentary  Osteoprorosis Risk.    Review of Systems   Cardiovascular:        Hyperlipidemia   Musculoskeletal:        Osteoporosis   All other systems reviewed and are negative.      Pertinent items are noted in HPI.    Objective    Physical Exam   Constitutional: She is oriented to person, place, and time. She appears well-developed and  well-nourished.   HENT:   Head: Normocephalic and atraumatic.   Neck: Trachea normal and phonation normal. Neck supple. Normal carotid pulses present. Carotid bruit is not present. No thyroid mass and no thyromegaly present.   Cardiovascular: Normal rate, regular rhythm and normal heart sounds. Exam reveals no gallop and no friction rub.   No murmur heard.  Pulmonary/Chest: Effort normal and breath sounds normal. No respiratory distress. She has no decreased breath sounds. She has no wheezes. She has no rhonchi. She has no rales.   Lymphadenopathy:     She has no cervical adenopathy.   Neurological: She is alert and oriented to person, place, and time.   Skin: Skin is warm and dry. No rash noted.   Psychiatric: She has a normal mood and affect. Her speech is normal and behavior is normal. Judgment and thought content normal. Cognition and memory are normal.   Nursing note and vitals reviewed.    No visits with results within 4 Week(s) from this visit.   Latest known visit with results is:   Orders Only on 02/13/2020   Component Date Value Ref Range Status   • WBC 03/05/2020 11.40* 3.40 - 10.80 10*3/mm3 Final   • RBC 03/05/2020 4.29  3.77 - 5.28 10*6/mm3 Final   • Hemoglobin 03/05/2020 12.9  12.0 - 15.9 g/dL Final   • Hematocrit 03/05/2020 38.6  34.0 - 46.6 % Final   • MCV 03/05/2020 90.0  79.0 - 97.0 fL Final   • MCH 03/05/2020 30.1  26.6 - 33.0 pg Final   • MCHC 03/05/2020 33.4  31.5 - 35.7 g/dL Final   • RDW 03/05/2020 12.8  12.3 - 15.4 % Final   • Platelets 03/05/2020 233  140 - 450 10*3/mm3 Final   • Neutrophil Rel % 03/05/2020 67.6  42.7 - 76.0 % Final   • Lymphocyte Rel % 03/05/2020 18.2* 19.6 - 45.3 % Final   • Monocyte Rel % 03/05/2020 7.8  5.0 - 12.0 % Final   • Eosinophil Rel % 03/05/2020 5.4  0.3 - 6.2 % Final   • Basophil Rel % 03/05/2020 0.6  0.0 - 1.5 % Final   • Neutrophils Absolute 03/05/2020 7.70* 1.70 - 7.00 10*3/mm3 Final   • Lymphocytes Absolute 03/05/2020 2.07  0.70 - 3.10 10*3/mm3 Final   •  "Monocytes Absolute 03/05/2020 0.89  0.10 - 0.90 10*3/mm3 Final   • Eosinophils Absolute 03/05/2020 0.62* 0.00 - 0.40 10*3/mm3 Final   • Basophils Absolute 03/05/2020 0.07  0.00 - 0.20 10*3/mm3 Final   • Immature Granulocyte Rel % 03/05/2020 0.4  0.0 - 0.5 % Final   • Immature Grans Absolute 03/05/2020 0.05  0.00 - 0.05 10*3/mm3 Final   • nRBC 03/05/2020 0.1  0.0 - 0.2 /100 WBC Final   • Glucose 03/05/2020 87  65 - 99 mg/dL Final   • BUN 03/05/2020 13  8 - 23 mg/dL Final   • Creatinine 03/05/2020 0.92  0.57 - 1.00 mg/dL Final   • eGFR Non African Am 03/05/2020 61  >60 mL/min/1.73 Final   • eGFR African Am 03/05/2020 74  >60 mL/min/1.73 Final   • BUN/Creatinine Ratio 03/05/2020 14.1  7.0 - 25.0 Final   • Sodium 03/05/2020 141  136 - 145 mmol/L Final   • Potassium 03/05/2020 4.3  3.5 - 5.2 mmol/L Final   • Chloride 03/05/2020 101  98 - 107 mmol/L Final   • Total CO2 03/05/2020 28.0  22.0 - 29.0 mmol/L Final   • Calcium 03/05/2020 9.2  8.6 - 10.5 mg/dL Final   • Total Protein 03/05/2020 6.6  6.0 - 8.5 g/dL Final   • Albumin 03/05/2020 4.20  3.50 - 5.20 g/dL Final   • Globulin 03/05/2020 2.4  gm/dL Final   • A/G Ratio 03/05/2020 1.8  g/dL Final   • Total Bilirubin 03/05/2020 0.4  0.2 - 1.2 mg/dL Final   • Alkaline Phosphatase 03/05/2020 83  39 - 117 U/L Final   • AST (SGOT) 03/05/2020 12  1 - 32 U/L Final   • ALT (SGPT) 03/05/2020 21  1 - 33 U/L Final   • Total Cholesterol 03/05/2020 182  0 - 200 mg/dL Final   • Triglycerides 03/05/2020 67  0 - 150 mg/dL Final   • HDL Cholesterol 03/05/2020 66* 40 - 60 mg/dL Final   • VLDL Cholesterol 03/05/2020 13.4  mg/dL Final   • LDL Cholesterol  03/05/2020 103* 0 - 100 mg/dL Final   • TSH 03/05/2020 2.100  0.270 - 4.200 uIU/mL Final         See physical exam documented above.    Vitals:    03/12/20 1039   BP: 116/68   Pulse: 68   SpO2: 98%   Weight: 46.3 kg (102 lb)   Height: 154.9 cm (61\")       Body mass index is 19.27 kg/m².    Assessment/Plan   Patient Self-Management and " Personalized Health Advice  The patient has been provided with information about: diet, exercise, weight management and prevention of cardiac or vascular disease and preventive services including:   · Annual Wellness Visit (AWV)  · Bone Density Measurements  · Cardiovascular Disease Screening Tests (may do this order every 5 years in beneficiaries without signs or symptoms of cardiovascular disease)  · Colorectal Cancer Screening, Cologuard Test   · Diabetes Screening-Lab Order for either glucose quantitative blood (except reagent strip), glucose;post glucose dose(includes glucose), or glucose tolerance test-3 specimens(includes glucose)  · Hepatitis C Virus Screening (beneficiaries must fall into one of the following categories to be eligible- high risk for HCV infection, born between 8051-4310, or history of blood transfusion before 1992)  · Influenza Vaccine and Administration  · Initial Preventative Physical Examination (IPPE)  · Pneumococcal Vaccine and Administration  · Screening Mammography .    Discussed the patient's BMI with her. The BMI is in the acceptable range.    Orders:  Orders Placed This Encounter   Procedures   • Mammo Screening Bilateral With CAD   • Cologuard - Stool, Per Rectum   • Ambulatory Referral to [facility] For Infusion Treatment     Patient Counseling:  --Nutrition: Stressed importance of moderation in sodium/caffeine intake, saturated fat and cholesterol.  Discussed caloric balance, sufficient intake of fresh fruits, vegetables, fiber,   calcium, iron.  --Discussed the new recommendation against daily use of baby aspirin for primary prevention in low risk patients.  --Exercise: Stressed the importance of regular exercise by incorporating into daily routine.    --Substance Abuse: Discussed cessation/primary prevention of tobacco, alcohol, or other drug use; driving or other dangerous activities under the influence.    --Dental health: Discussed importance of regular tooth brushing, flossing,  and dental visits.  -- Suggested having eyes and vision checked if needed or past due.  --Immunizations reviewed.  --Discussed benefits of screening colonoscopy.  Patient counseled for 5 minutes on smoking cessation.  At this time patient is still considering this and is making no commitment.  Time also spent in counseling in regards to colon cancer screening which patient chooses Cologuard.  Mammogram has been ordered and treatment plan for Prolia will be initiated.  Follow Up:  Return in about 6 months (around 9/12/2020) for Recheck.     An After Visit Summary and PPPS with all of these plans were given to the patient.

## 2020-03-15 PROBLEM — D72.829 LEUKOCYTOSIS: Status: ACTIVE | Noted: 2020-03-15

## 2020-03-15 PROBLEM — F17.210 CIGARETTE NICOTINE DEPENDENCE WITHOUT COMPLICATION: Status: ACTIVE | Noted: 2020-03-15

## 2020-03-24 ENCOUNTER — APPOINTMENT (OUTPATIENT)
Dept: MAMMOGRAPHY | Facility: HOSPITAL | Age: 65
End: 2020-03-24

## 2020-04-28 ENCOUNTER — APPOINTMENT (OUTPATIENT)
Dept: MAMMOGRAPHY | Facility: HOSPITAL | Age: 65
End: 2020-04-28

## 2020-05-21 ENCOUNTER — TELEPHONE (OUTPATIENT)
Dept: FAMILY MEDICINE CLINIC | Facility: CLINIC | Age: 65
End: 2020-05-21

## 2020-05-21 ENCOUNTER — HOSPITAL ENCOUNTER (OUTPATIENT)
Dept: MAMMOGRAPHY | Facility: HOSPITAL | Age: 65
Discharge: HOME | End: 2020-05-21
Admitting: FAMILY MEDICINE

## 2020-05-21 DIAGNOSIS — Z12.31 ENCOUNTER FOR SCREENING MAMMOGRAM FOR MALIGNANT NEOPLASM OF BREAST: ICD-10-CM

## 2020-05-21 PROCEDURE — 77067 SCR MAMMO BI INCL CAD: CPT

## 2020-05-21 PROCEDURE — 77063 BREAST TOMOSYNTHESIS BI: CPT

## 2020-05-21 NOTE — TELEPHONE ENCOUNTER
Pt saw Dr. Nicole on 3/12/2020. He discussed ordering cologuard for her. The patient still has not received it, and she would like to see if he will re-order it.

## 2020-05-27 ENCOUNTER — TELEPHONE (OUTPATIENT)
Dept: FAMILY MEDICINE CLINIC | Facility: CLINIC | Age: 65
End: 2020-05-27

## 2020-05-27 NOTE — TELEPHONE ENCOUNTER
PATIENT CALLING IN TO CHECK ON THE STATUS OF THE COLOGUARD.  PATIENT WAS TOLD IT WOULD BE ABOUT 5 WEEKS AND THAT WAS IN MARCH.      PLEASE CALL AND ADVISE -[phone]

## 2020-06-17 ENCOUNTER — TELEPHONE (OUTPATIENT)
Dept: FAMILY MEDICINE CLINIC | Facility: CLINIC | Age: 65
End: 2020-06-17

## 2020-06-17 NOTE — TELEPHONE ENCOUNTER
Nurse from [facility] calling, pt is to have a Prolia injection on Friday but she needs a Therapy Plan entered.  They said the one in the chart is from 3/2019 and they cant use it.

## 2020-06-18 DIAGNOSIS — M81.0 AGE-RELATED OSTEOPOROSIS WITHOUT CURRENT PATHOLOGICAL FRACTURE: Primary | ICD-10-CM

## 2020-06-19 ENCOUNTER — HOSPITAL ENCOUNTER (OUTPATIENT)
Dept: INFUSION THERAPY | Facility: HOSPITAL | Age: 65
Discharge: HOME | End: 2020-06-19
Admitting: FAMILY MEDICINE

## 2020-06-19 VITALS
RESPIRATION RATE: 16 BRPM | HEART RATE: 60 BPM | OXYGEN SATURATION: 99 % | SYSTOLIC BLOOD PRESSURE: 140 MMHG | DIASTOLIC BLOOD PRESSURE: 68 MMHG | TEMPERATURE: 97.9 F

## 2020-06-19 DIAGNOSIS — M80.00XD AGE-RELATED OSTEOPOROSIS WITH CURRENT PATHOLOGICAL FRACTURE WITH ROUTINE HEALING, SUBSEQUENT ENCOUNTER: Primary | ICD-10-CM

## 2020-06-19 DIAGNOSIS — M81.0 AGE-RELATED OSTEOPOROSIS WITHOUT CURRENT PATHOLOGICAL FRACTURE: ICD-10-CM

## 2020-06-19 LAB
CALCIUM SPEC-SCNC: 9 MG/DL (ref 8.6–10.5)
MAGNESIUM SERPL-MCNC: 1.9 MG/DL (ref 1.6–2.4)
PHOSPHATE SERPL-MCNC: 3.7 MG/DL (ref 2.5–4.5)

## 2020-06-19 PROCEDURE — 82310 ASSAY OF CALCIUM: CPT | Performed by: FAMILY MEDICINE

## 2020-06-19 PROCEDURE — 83735 ASSAY OF MAGNESIUM: CPT | Performed by: FAMILY MEDICINE

## 2020-06-19 PROCEDURE — 96372 THER/PROPH/DIAG INJ SC/IM: CPT

## 2020-06-19 PROCEDURE — 25010000002 DENOSUMAB 60 MG/ML SOLUTION PREFILLED SYRINGE: Performed by: FAMILY MEDICINE

## 2020-06-19 PROCEDURE — 84100 ASSAY OF PHOSPHORUS: CPT | Performed by: FAMILY MEDICINE

## 2020-06-19 RX ADMIN — DENOSUMAB 60 MG: 60 INJECTION SUBCUTANEOUS at 10:37

## 2020-06-19 NOTE — PATIENT INSTRUCTIONS
"  Call Dr. Baudilio Nicole [facility] at [phone] if you have any problems or concerns.    We know you have a Choice in healthcare and appreciate you using [facility].  Our purpose is to provide you \"Excellent Care\".  We hope that you will always choose us in the future and continue to recommend us to your family and friends.            Denosumab injection  What is this medicine?  DENOSUMAB (den oh mariluz mab) slows bone breakdown. Prolia is used to treat osteoporosis in women after menopause and in men, and in people who are taking corticosteroids for 6 months or more. Xgeva is used to treat a high calcium level due to cancer and to prevent bone fractures and other bone problems caused by multiple myeloma or cancer bone metastases. Xgeva is also used to treat giant cell tumor of the bone.  This medicine may be used for other purposes; ask your health care provider or pharmacist if you have questions.  COMMON BRAND NAME(S): Prolia, XGEVA  What should I tell my health care provider before I take this medicine?  They need to know if you have any of these conditions:  · dental disease  · having surgery or tooth extraction  · infection  · kidney disease  · low levels of calcium or Vitamin D in the blood  · malnutrition  · on hemodialysis  · skin conditions or sensitivity  · thyroid or parathyroid disease  · an unusual reaction to denosumab, other medicines, foods, dyes, or preservatives  · pregnant or trying to get pregnant  · breast-feeding  How should I use this medicine?  This medicine is for injection under the skin. It is given by a health care professional in a hospital or clinic setting.  A special MedGuide will be given to you before each treatment. Be sure to read this information carefully each time.  For Prolia, talk to your pediatrician regarding the use of this medicine in children. Special care may be needed. For Xgeva, talk to your pediatrician regarding the use " of this medicine in children. While this drug may be prescribed for children as young as 13 years for selected conditions, precautions do apply.  Overdosage: If you think you have taken too much of this medicine contact a poison control center or emergency room at once.  NOTE: This medicine is only for you. Do not share this medicine with others.  What if I miss a dose?  It is important not to miss your dose. Call your doctor or health care professional if you are unable to keep an appointment.  What may interact with this medicine?  Do not take this medicine with any of the following medications:  · other medicines containing denosumab  This medicine may also interact with the following medications:  · medicines that lower your chance of fighting infection  · steroid medicines like prednisone or cortisone  This list may not describe all possible interactions. Give your health care provider a list of all the medicines, herbs, non-prescription drugs, or dietary supplements you use. Also tell them if you smoke, drink alcohol, or use illegal drugs. Some items may interact with your medicine.  What should I watch for while using this medicine?  Visit your doctor or health care professional for regular checks on your progress. Your doctor or health care professional may order blood tests and other tests to see how you are doing.  Call your doctor or health care professional for advice if you get a fever, chills or sore throat, or other symptoms of a cold or flu. Do not treat yourself. This drug may decrease your body's ability to fight infection. Try to avoid being around people who are sick.  You should make sure you get enough calcium and vitamin D while you are taking this medicine, unless your doctor tells you not to. Discuss the foods you eat and the vitamins you take with your health care professional.  See your dentist regularly. Brush and floss your teeth as directed. Before you have any dental work done, tell  your dentist you are receiving this medicine.  Do not become pregnant while taking this medicine or for 5 months after stopping it. Talk with your doctor or health care professional about your birth control options while taking this medicine. Women should inform their doctor if they wish to become pregnant or think they might be pregnant. There is a potential for serious side effects to an unborn child. Talk to your health care professional or pharmacist for more information.  What side effects may I notice from receiving this medicine?  Side effects that you should report to your doctor or health care professional as soon as possible:  · allergic reactions like skin rash, itching or hives, swelling of the face, lips, or tongue  · bone pain  · breathing problems  · dizziness  · jaw pain, especially after dental work  · redness, blistering, peeling of the skin  · signs and symptoms of infection like fever or chills; cough; sore throat; pain or trouble passing urine  · signs of low calcium like fast heartbeat, muscle cramps or muscle pain; pain, tingling, numbness in the hands or feet; seizures  · unusual bleeding or bruising  · unusually weak or tired  Side effects that usually do not require medical attention (report to your doctor or health care professional if they continue or are bothersome):  · constipation  · diarrhea  · headache  · joint pain  · loss of appetite  · muscle pain  · runny nose  · tiredness  · upset stomach  This list may not describe all possible side effects. Call your doctor for medical advice about side effects. You may report side effects to FDA at 0-002-FDA-2170.  Where should I keep my medicine?  This medicine is only given in a clinic, doctor's office, or other health care setting and will not be stored at home.  NOTE: This sheet is a summary. It may not cover all possible information. If you have questions about this medicine, talk to your doctor, pharmacist, or health care provider.  ©  2020 Elsevier/Gold Standard (2019-04-26 16:10:44)

## 2020-06-19 NOTE — NURSING NOTE
Patient arrived to [facility] at 0950.  Medication given as ordered.  Tolerated without complication.  AVS discussed and copy given to patient.  Discharged ambulatory from [facility] at 1045

## 2020-06-30 ENCOUNTER — TELEPHONE (OUTPATIENT)
Dept: FAMILY MEDICINE CLINIC | Facility: CLINIC | Age: 65
End: 2020-06-30

## 2020-07-01 DIAGNOSIS — Z00.00 HEALTHCARE MAINTENANCE: Primary | ICD-10-CM

## 2020-07-20 ENCOUNTER — TELEPHONE (OUTPATIENT)
Dept: FAMILY MEDICINE CLINIC | Facility: CLINIC | Age: 65
End: 2020-07-20

## 2020-07-20 DIAGNOSIS — G89.29 CHRONIC EAR PAIN, BILATERAL: Primary | ICD-10-CM

## 2020-07-20 DIAGNOSIS — H92.03 CHRONIC EAR PAIN, BILATERAL: Primary | ICD-10-CM

## 2020-07-20 NOTE — TELEPHONE ENCOUNTER
Pt is having a problem with feeling as though her ears are plugged.  She made an appt with Dr Jose Alfredo PALOMINO who she has seen before but her insurance requires a referral.  Ok to give?

## 2020-07-20 NOTE — TELEPHONE ENCOUNTER
PATIENT WAS CALLING TO SEE IF DR ANDERSON WOULD BE ABLE TO WRITE A REFERRAL TO ENT. PATIENT HAS MEDICARE HMO PLAN THAT SOMETIMES NEEDS A REFERRAL FOR SPECIALIST OFFICES AND SHE WANTS TO COVER HER BASES.     PATIENT HAS SCHEDULED AN UPCOMING APPOINTMENT FOR 7-27-20  WITH DR BRYAN ARRIETA WHOM SHE SAW IN 2018 AT [facility].     THEIR OFFICE PHONE NUMBER - [phone]     PATIENT CAN BE REACHED [phone] AND WOULD LIKE A CALL BACK

## 2020-07-21 NOTE — TELEPHONE ENCOUNTER
PT prefers to have referral.  She states we havent been able to take care of cerumen impaction in past, it is an issue she has and is sure that's what is going on this time.

## 2020-08-04 ENCOUNTER — OFFICE VISIT (OUTPATIENT)
Dept: FAMILY MEDICINE CLINIC | Facility: CLINIC | Age: 65
End: 2020-08-04

## 2020-08-04 VITALS
TEMPERATURE: 98.3 F | HEART RATE: 86 BPM | OXYGEN SATURATION: 98 % | HEIGHT: 61 IN | DIASTOLIC BLOOD PRESSURE: 70 MMHG | SYSTOLIC BLOOD PRESSURE: 110 MMHG | WEIGHT: 106 LBS | BODY MASS INDEX: 20.01 KG/M2 | RESPIRATION RATE: 16 BRPM

## 2020-08-04 DIAGNOSIS — J30.9 ALLERGIC RHINITIS, UNSPECIFIED SEASONALITY, UNSPECIFIED TRIGGER: Primary | ICD-10-CM

## 2020-08-04 PROCEDURE — 99213 OFFICE O/P EST LOW 20 MIN: CPT | Performed by: NURSE PRACTITIONER

## 2020-08-04 RX ORDER — LORATADINE 10 MG/1
10 TABLET ORAL DAILY
COMMUNITY
Start: 2020-08-04 | End: 2021-01-04

## 2020-08-04 RX ORDER — FLUTICASONE PROPIONATE 50 MCG
2 SPRAY, SUSPENSION (ML) NASAL DAILY
COMMUNITY
Start: 2020-08-04 | End: 2021-01-04

## 2020-08-04 NOTE — PROGRESS NOTES
"Chief Complaint   Patient presents with   • Ear Fullness       Upper Respiratory Infection: Patient complains of possible sinusitis. Symptoms include congestion. Onset of symptoms was several months ago, gradually worsening over the past 6 weeks since that time. She also c/o head congestion for the past several months .  She is drinking moderate amounts of fluids. Evaluation to date: none. Treatment to date: none.  Ill contacts at home or school or work discussed.    The following portions of the patient's history were reviewed and updated as appropriate: allergies, current medications, past family history, past medical history, past social history, past surgical history and problem list.    A comprehensive review of systems was negative except for: Ears, nose, mouth, throat, and face: positive for head congestion    Vitals:    08/04/20 1128   BP: 110/70   BP Location: Left arm   Patient Position: Sitting   Cuff Size: Adult   Pulse: 86   Resp: 16   Temp: 98.3 °F (36.8 °C)   SpO2: 98%   Weight: 48.1 kg (106 lb)   Height: 154.9 cm (61\")     Gen: Well appearing, alert  Head:  Maxillary sinus pressure  Ears: TM's normal without redness  Nose:  No Congestion  Throat:  Red without exudate, some drainage  Neck: No LAD  Lung: Good air movement, regular RR  Heart: RR without murmur  Skin: No rash      Assessment/Plan   Violeta was seen today for ear fullness.    Diagnoses and all orders for this visit:    Allergic rhinitis, unspecified seasonality, unspecified trigger    Other orders  -     fluticasone (Flonase) 50 MCG/ACT nasal spray; 2 sprays into the nostril(s) as directed by provider Daily.  -     loratadine (Claritin) 10 MG tablet; Take 1 tablet by mouth Daily.             Patient Instructions   Allergic Rhinitis, Adult  Allergic rhinitis is a reaction to allergens in the air. Allergens are tiny specks (particles) in the air that cause your body to have an allergic reaction. This condition cannot be passed from person to " person (is not contagious). Allergic rhinitis cannot be cured, but it can be controlled.  There are two types of allergic rhinitis:  · Seasonal. This type is also called hay fever. It happens only during certain times of the year.  · Perennial. This type can happen at any time of the year.  What are the causes?  This condition may be caused by:  · Pollen from grasses, trees, and weeds.  · House dust mites.  · Pet dander.  · Mold.  What are the signs or symptoms?  Symptoms of this condition include:  · Sneezing.  · Runny or stuffy nose (nasal congestion).  · A lot of mucus in the back of the throat (postnasal drip).  · Itchy nose.  · Tearing of the eyes.  · Trouble sleeping.  · Being sleepy during day.  How is this treated?  There is no cure for this condition. You should avoid things that trigger your symptoms (allergens). Treatment can help to relieve symptoms. This may include:  · Medicines that block allergy symptoms, such as antihistamines. These may be given as a shot, nasal spray, or pill.  · Shots that are given until your body becomes less sensitive to the allergen (desensitization).  · Stronger medicines, if all other treatments have not worked.  Follow these instructions at home:  Avoiding allergens    · Find out what you are allergic to. Common allergens include smoke, dust, and pollen.  · Avoid them if you can. These are some of the things that you can do to avoid allergens:  ? Replace carpet with wood, tile, or vinyl doretha. Carpet can trap dander and dust.  ? Clean any mold found in the home.  ? Do not smoke. Do not allow smoking in your home.  ? Change your heating and air conditioning filter at least once a month.  ? During allergy season:  § Keep windows closed as much as you can. If possible, use air conditioning when there is a lot of pollen in the air.  § Use a special filter for allergies with your furnace and air conditioner.  § Plan outdoor activities when pollen counts are lowest. This is  usually during the early morning or evening hours.  § If you do go outdoors when pollen count is high, wear a special mask for people with allergies.  § When you come indoors, take a shower and change your clothes before sitting on furniture or bedding.  General instructions  · Do not use fans in your home.  · Do not hang clothes outside to dry.  · Wear sunglasses to keep pollen out of your eyes.  · Wash your hands right away after you touch household pets.  · Take over-the-counter and prescription medicines only as told by your doctor.  · Keep all follow-up visits as told by your doctor. This is important.  Contact a doctor if:  · You have a fever.  · You have a cough that does not go away (is persistent).  · You start to make whistling sounds when you breathe (wheeze).  · Your symptoms do not get better with treatment.  · You have thick fluid coming from your nose.  · You start to have nosebleeds.  Get help right away if:  · Your tongue or your lips are swollen.  · You have trouble breathing.  · You feel dizzy or you feel like you are going to pass out (faint).  · You have cold sweats.  Summary  · Allergic rhinitis is a reaction to allergens in the air.  · This condition may be caused by allergens. These include pollen, dust mites, pet dander, and mold.  · Symptoms include a runny, itchy nose, sneezing, or tearing eyes. You may also have trouble sleeping or feel sleepy during the day.  · Treatment includes taking medicines and avoiding allergens. You may also get shots or take stronger medicines.  · Get help if you have a fever or a cough that does not stop. Get help right away if you are short of breath.  This information is not intended to replace advice given to you by your health care provider. Make sure you discuss any questions you have with your health care provider.  Document Released: 04/18/2012 Document Revised: 04/07/2020 Document Reviewed: 07/09/2019  Elsevier Patient Education © 2020 Elsevier  Inc.        Tylenol or Advil as needed for pain, fever, muscle aches  Plenty of fluids  Hand washing discussed  Off work or school note given if needed.  Warm tea for throat.  Pros and cons of antibiotic use discussed.  Instructed to notify us if symptoms worsen or do not improve.      WALKER Hastings  Family Practice  [facility]

## 2020-08-04 NOTE — PATIENT INSTRUCTIONS
Allergic Rhinitis, Adult  Allergic rhinitis is a reaction to allergens in the air. Allergens are tiny specks (particles) in the air that cause your body to have an allergic reaction. This condition cannot be passed from person to person (is not contagious). Allergic rhinitis cannot be cured, but it can be controlled.  There are two types of allergic rhinitis:  · Seasonal. This type is also called hay fever. It happens only during certain times of the year.  · Perennial. This type can happen at any time of the year.  What are the causes?  This condition may be caused by:  · Pollen from grasses, trees, and weeds.  · House dust mites.  · Pet dander.  · Mold.  What are the signs or symptoms?  Symptoms of this condition include:  · Sneezing.  · Runny or stuffy nose (nasal congestion).  · A lot of mucus in the back of the throat (postnasal drip).  · Itchy nose.  · Tearing of the eyes.  · Trouble sleeping.  · Being sleepy during day.  How is this treated?  There is no cure for this condition. You should avoid things that trigger your symptoms (allergens). Treatment can help to relieve symptoms. This may include:  · Medicines that block allergy symptoms, such as antihistamines. These may be given as a shot, nasal spray, or pill.  · Shots that are given until your body becomes less sensitive to the allergen (desensitization).  · Stronger medicines, if all other treatments have not worked.  Follow these instructions at home:  Avoiding allergens    · Find out what you are allergic to. Common allergens include smoke, dust, and pollen.  · Avoid them if you can. These are some of the things that you can do to avoid allergens:  ? Replace carpet with wood, tile, or vinyl doretha. Carpet can trap dander and dust.  ? Clean any mold found in the home.  ? Do not smoke. Do not allow smoking in your home.  ? Change your heating and air conditioning filter at least once a month.  ? During allergy season:  § Keep windows closed as much as  you can. If possible, use air conditioning when there is a lot of pollen in the air.  § Use a special filter for allergies with your furnace and air conditioner.  § Plan outdoor activities when pollen counts are lowest. This is usually during the early morning or evening hours.  § If you do go outdoors when pollen count is high, wear a special mask for people with allergies.  § When you come indoors, take a shower and change your clothes before sitting on furniture or bedding.  General instructions  · Do not use fans in your home.  · Do not hang clothes outside to dry.  · Wear sunglasses to keep pollen out of your eyes.  · Wash your hands right away after you touch household pets.  · Take over-the-counter and prescription medicines only as told by your doctor.  · Keep all follow-up visits as told by your doctor. This is important.  Contact a doctor if:  · You have a fever.  · You have a cough that does not go away (is persistent).  · You start to make whistling sounds when you breathe (wheeze).  · Your symptoms do not get better with treatment.  · You have thick fluid coming from your nose.  · You start to have nosebleeds.  Get help right away if:  · Your tongue or your lips are swollen.  · You have trouble breathing.  · You feel dizzy or you feel like you are going to pass out (faint).  · You have cold sweats.  Summary  · Allergic rhinitis is a reaction to allergens in the air.  · This condition may be caused by allergens. These include pollen, dust mites, pet dander, and mold.  · Symptoms include a runny, itchy nose, sneezing, or tearing eyes. You may also have trouble sleeping or feel sleepy during the day.  · Treatment includes taking medicines and avoiding allergens. You may also get shots or take stronger medicines.  · Get help if you have a fever or a cough that does not stop. Get help right away if you are short of breath.  This information is not intended to replace advice given to you by your health care  provider. Make sure you discuss any questions you have with your health care provider.  Document Released: 04/18/2012 Document Revised: 04/07/2020 Document Reviewed: 07/09/2019  Elsevier Patient Education © 2020 Elsevier Inc.

## 2020-12-21 ENCOUNTER — APPOINTMENT (OUTPATIENT)
Dept: INFUSION THERAPY | Facility: HOSPITAL | Age: 65
End: 2020-12-21

## 2021-01-04 ENCOUNTER — HOSPITAL ENCOUNTER (OUTPATIENT)
Dept: INFUSION THERAPY | Facility: HOSPITAL | Age: 66
Discharge: HOME | End: 2021-01-04
Admitting: FAMILY MEDICINE

## 2021-01-04 VITALS
HEIGHT: 62 IN | HEART RATE: 81 BPM | WEIGHT: 105 LBS | OXYGEN SATURATION: 98 % | SYSTOLIC BLOOD PRESSURE: 126 MMHG | DIASTOLIC BLOOD PRESSURE: 67 MMHG | TEMPERATURE: 97.6 F | BODY MASS INDEX: 19.32 KG/M2 | RESPIRATION RATE: 16 BRPM

## 2021-01-04 DIAGNOSIS — M81.0 AGE-RELATED OSTEOPOROSIS WITHOUT CURRENT PATHOLOGICAL FRACTURE: Primary | ICD-10-CM

## 2021-01-04 LAB
CALCIUM SPEC-SCNC: 9.2 MG/DL (ref 8.6–10.5)
MAGNESIUM SERPL-MCNC: 2.1 MG/DL (ref 1.6–2.4)
PHOSPHATE SERPL-MCNC: 2.3 MG/DL (ref 2.5–4.5)

## 2021-01-04 PROCEDURE — 83735 ASSAY OF MAGNESIUM: CPT | Performed by: FAMILY MEDICINE

## 2021-01-04 PROCEDURE — 96372 THER/PROPH/DIAG INJ SC/IM: CPT

## 2021-01-04 PROCEDURE — 84100 ASSAY OF PHOSPHORUS: CPT | Performed by: FAMILY MEDICINE

## 2021-01-04 PROCEDURE — 82310 ASSAY OF CALCIUM: CPT | Performed by: FAMILY MEDICINE

## 2021-01-04 PROCEDURE — 36415 COLL VENOUS BLD VENIPUNCTURE: CPT

## 2021-01-04 PROCEDURE — 25010000002 DENOSUMAB 60 MG/ML SOLUTION PREFILLED SYRINGE: Performed by: FAMILY MEDICINE

## 2021-01-04 RX ADMIN — DENOSUMAB 60 MG: 60 INJECTION SUBCUTANEOUS at 11:01

## 2021-01-04 NOTE — NURSING NOTE
1055 Called [facility], Labs reported , abnormal Phos. .OK'd to receive Prolia per Dr. Barrett. Светлана Melissa RN

## 2021-01-04 NOTE — NURSING NOTE
NURSING PROGRESS NOTE      Pt to [facility] per  scheduled appointment.  Pt ID verified by (2) identifiers. Allergies, medications and medical history reviewed and verified.lLabs obtained.  Tolerated prescribed treatment without incident. Patient received AVS, Pt verbalized understanding.  Discharged to home @ 1115 Condition Satisfactory .  Светлана Melissa RN

## 2021-01-04 NOTE — PATIENT INSTRUCTIONS
"Call Dr. Baudilio Nicole [facility] at [phone] if you have any problems or concerns.    We know you have a Choice in healthcare and appreciate you using [facility].  Our purpose is to provide you \"Excellent Care\".  We hope that you will always choose us in the future and continue to recommend us to your family and friends.    Denosumab injection  What is this medicine?  DENOSUMAB (den oh mariluz mab) slows bone breakdown. Prolia is used to treat osteoporosis in women after menopause and in men, and in people who are taking corticosteroids for 6 months or more. Xgeva is used to treat a high calcium level due to cancer and to prevent bone fractures and other bone problems caused by multiple myeloma or cancer bone metastases. Xgeva is also used to treat giant cell tumor of the bone.  This medicine may be used for other purposes; ask your health care provider or pharmacist if you have questions.  COMMON BRAND NAME(S): Prolia, XGEVA  What should I tell my health care provider before I take this medicine?  They need to know if you have any of these conditions:  · dental disease  · having surgery or tooth extraction  · infection  · kidney disease  · low levels of calcium or Vitamin D in the blood  · malnutrition  · on hemodialysis  · skin conditions or sensitivity  · thyroid or parathyroid disease  · an unusual reaction to denosumab, other medicines, foods, dyes, or preservatives  · pregnant or trying to get pregnant  · breast-feeding  How should I use this medicine?  This medicine is for injection under the skin. It is given by a health care professional in a hospital or clinic setting.  A special MedGuide will be given to you before each treatment. Be sure to read this information carefully each time.  For Prolia, talk to your pediatrician regarding the use of this medicine in children. Special care may be needed. For Xgeva, talk to your pediatrician regarding the use of this " medicine in children. While this drug may be prescribed for children as young as 13 years for selected conditions, precautions do apply.  Overdosage: If you think you have taken too much of this medicine contact a poison control center or emergency room at once.  NOTE: This medicine is only for you. Do not share this medicine with others.  What if I miss a dose?  It is important not to miss your dose. Call your doctor or health care professional if you are unable to keep an appointment.  What may interact with this medicine?  Do not take this medicine with any of the following medications:  · other medicines containing denosumab  This medicine may also interact with the following medications:  · medicines that lower your chance of fighting infection  · steroid medicines like prednisone or cortisone  This list may not describe all possible interactions. Give your health care provider a list of all the medicines, herbs, non-prescription drugs, or dietary supplements you use. Also tell them if you smoke, drink alcohol, or use illegal drugs. Some items may interact with your medicine.  What should I watch for while using this medicine?  Visit your doctor or health care professional for regular checks on your progress. Your doctor or health care professional may order blood tests and other tests to see how you are doing.  Call your doctor or health care professional for advice if you get a fever, chills or sore throat, or other symptoms of a cold or flu. Do not treat yourself. This drug may decrease your body's ability to fight infection. Try to avoid being around people who are sick.  You should make sure you get enough calcium and vitamin D while you are taking this medicine, unless your doctor tells you not to. Discuss the foods you eat and the vitamins you take with your health care professional.  See your dentist regularly. Brush and floss your teeth as directed. Before you have any dental work done, tell your  dentist you are receiving this medicine.  Do not become pregnant while taking this medicine or for 5 months after stopping it. Talk with your doctor or health care professional about your birth control options while taking this medicine. Women should inform their doctor if they wish to become pregnant or think they might be pregnant. There is a potential for serious side effects to an unborn child. Talk to your health care professional or pharmacist for more information.  What side effects may I notice from receiving this medicine?  Side effects that you should report to your doctor or health care professional as soon as possible:  · allergic reactions like skin rash, itching or hives, swelling of the face, lips, or tongue  · bone pain  · breathing problems  · dizziness  · jaw pain, especially after dental work  · redness, blistering, peeling of the skin  · signs and symptoms of infection like fever or chills; cough; sore throat; pain or trouble passing urine  · signs of low calcium like fast heartbeat, muscle cramps or muscle pain; pain, tingling, numbness in the hands or feet; seizures  · unusual bleeding or bruising  · unusually weak or tired  Side effects that usually do not require medical attention (report to your doctor or health care professional if they continue or are bothersome):  · constipation  · diarrhea  · headache  · joint pain  · loss of appetite  · muscle pain  · runny nose  · tiredness  · upset stomach  This list may not describe all possible side effects. Call your doctor for medical advice about side effects. You may report side effects to FDA at 2-452-FDA-7197.  Where should I keep my medicine?  This medicine is only given in a clinic, doctor's office, or other health care setting and will not be stored at home.  NOTE: This sheet is a summary. It may not cover all possible information. If you have questions about this medicine, talk to your doctor, pharmacist, or health care provider.  © 2020  Elsevier/Gold Standard (2019-04-26 16:10:44)

## 2021-03-16 ENCOUNTER — TELEPHONE (OUTPATIENT)
Dept: FAMILY MEDICINE CLINIC | Facility: CLINIC | Age: 66
End: 2021-03-16

## 2021-03-16 NOTE — TELEPHONE ENCOUNTER
Caller: Violeta Neff    Relationship: Self    Best call back number: [phone] (H)    Medication needed:   Requested Prescriptions      No prescriptions requested or ordered in this encounter   denosumab (PROLIA) syringe 60 mg    When do you need the refill by: ASAP    What additional details did the patient provide when requesting the medication: PATIENT GETS INJECTION AT [facility], PLEASE SEND PRESCRIPTION SO PATIENT CAN RECEIVE NEXT INJECTION, PATIENT WOULD LIKE A TEXT OR CALL WHEN COMPLETE     Does the patient have less than a 3 day supply:  [] Yes  [x] No    What is the patient's preferred pharmacy:  N/A

## 2021-03-17 DIAGNOSIS — M81.0 AGE-RELATED OSTEOPOROSIS WITHOUT CURRENT PATHOLOGICAL FRACTURE: Primary | ICD-10-CM

## 2021-03-22 ENCOUNTER — BULK ORDERING (OUTPATIENT)
Dept: CASE MANAGEMENT | Facility: OTHER | Age: 66
End: 2021-03-22

## 2021-03-22 DIAGNOSIS — Z23 IMMUNIZATION DUE: ICD-10-CM

## 2021-07-05 ENCOUNTER — HOSPITAL ENCOUNTER (OUTPATIENT)
Dept: INFUSION THERAPY | Facility: HOSPITAL | Age: 66
Discharge: HOME | End: 2021-07-05
Admitting: FAMILY MEDICINE

## 2021-07-05 VITALS
WEIGHT: 107 LBS | OXYGEN SATURATION: 99 % | BODY MASS INDEX: 19.69 KG/M2 | HEIGHT: 62 IN | RESPIRATION RATE: 16 BRPM | SYSTOLIC BLOOD PRESSURE: 114 MMHG | HEART RATE: 55 BPM | DIASTOLIC BLOOD PRESSURE: 62 MMHG | TEMPERATURE: 97.5 F

## 2021-07-05 DIAGNOSIS — M81.0 AGE-RELATED OSTEOPOROSIS WITHOUT CURRENT PATHOLOGICAL FRACTURE: Primary | ICD-10-CM

## 2021-07-05 LAB
ALBUMIN SERPL-MCNC: 4.4 G/DL (ref 3.5–5.2)
ALBUMIN/GLOB SERPL: 1.8 G/DL
ALP SERPL-CCNC: 56 U/L (ref 39–117)
ALT SERPL W P-5'-P-CCNC: 11 U/L (ref 1–33)
ANION GAP SERPL CALCULATED.3IONS-SCNC: 8.5 MMOL/L (ref 5–15)
AST SERPL-CCNC: 19 U/L (ref 1–32)
BILIRUB SERPL-MCNC: 0.3 MG/DL (ref 0–1.2)
BUN SERPL-MCNC: 18 MG/DL (ref 8–23)
BUN/CREAT SERPL: 20 (ref 7–25)
CALCIUM SPEC-SCNC: 9.6 MG/DL (ref 8.6–10.5)
CHLORIDE SERPL-SCNC: 108 MMOL/L (ref 98–107)
CO2 SERPL-SCNC: 25.5 MMOL/L (ref 22–29)
CREAT SERPL-MCNC: 0.9 MG/DL (ref 0.57–1)
GFR SERPL CREATININE-BSD FRML MDRD: 63 ML/MIN/1.73
GLOBULIN UR ELPH-MCNC: 2.5 GM/DL
GLUCOSE SERPL-MCNC: 84 MG/DL (ref 65–99)
MAGNESIUM SERPL-MCNC: 2 MG/DL (ref 1.6–2.4)
PHOSPHATE SERPL-MCNC: 3.5 MG/DL (ref 2.5–4.5)
POTASSIUM SERPL-SCNC: 4.2 MMOL/L (ref 3.5–5.2)
PROT SERPL-MCNC: 6.9 G/DL (ref 6–8.5)
SODIUM SERPL-SCNC: 142 MMOL/L (ref 136–145)

## 2021-07-05 PROCEDURE — 25010000002 DENOSUMAB 60 MG/ML SOLUTION PREFILLED SYRINGE: Performed by: FAMILY MEDICINE

## 2021-07-05 PROCEDURE — 85025 COMPLETE CBC W/AUTO DIFF WBC: CPT | Performed by: FAMILY MEDICINE

## 2021-07-05 PROCEDURE — 83735 ASSAY OF MAGNESIUM: CPT | Performed by: FAMILY MEDICINE

## 2021-07-05 PROCEDURE — 80053 COMPREHEN METABOLIC PANEL: CPT | Performed by: FAMILY MEDICINE

## 2021-07-05 PROCEDURE — 84100 ASSAY OF PHOSPHORUS: CPT | Performed by: FAMILY MEDICINE

## 2021-07-05 PROCEDURE — 36415 COLL VENOUS BLD VENIPUNCTURE: CPT

## 2021-07-05 PROCEDURE — 96372 THER/PROPH/DIAG INJ SC/IM: CPT

## 2021-07-05 RX ORDER — ANTIOX #8/OM3/DHA/EPA/LUT/ZEAX 250-2.5 MG
1 CAPSULE ORAL 2 TIMES DAILY
COMMUNITY

## 2021-07-05 RX ADMIN — DENOSUMAB 60 MG: 60 INJECTION SUBCUTANEOUS at 10:30

## 2021-07-05 NOTE — NURSING NOTE
NURSING PROGRESS NOTE      Pt to [facility] per  scheduled appointment.  Pt ID verified by (2) identifiers. Allergies, medications and medical history reviewed and verified. Tolerated prescribed treatment without incident. Patient received AVS, Pt verbalized understanding.  Discharged to home. Condition Satisfactory .  Светлана Melissa RN

## 2021-07-05 NOTE — PATIENT INSTRUCTIONS
Call Dr. Baudilio Nicole [facility] at [phone]      Denosumab injection  What is this medicine?  DENOSUMAB (den oh mariluz mab) slows bone breakdown. Prolia is used to treat osteoporosis in women after menopause and in men, and in people who are taking corticosteroids for 6 months or more. Xgeva is used to treat a high calcium level due to cancer and to prevent bone fractures and other bone problems caused by multiple myeloma or cancer bone metastases. Xgeva is also used to treat giant cell tumor of the bone.  This medicine may be used for other purposes; ask your health care provider or pharmacist if you have questions.  COMMON BRAND NAME(S): Prolia, XGEVA  What should I tell my health care provider before I take this medicine?  They need to know if you have any of these conditions:  · dental disease  · having surgery or tooth extraction  · infection  · kidney disease  · low levels of calcium or Vitamin D in the blood  · malnutrition  · on hemodialysis  · skin conditions or sensitivity  · thyroid or parathyroid disease  · an unusual reaction to denosumab, other medicines, foods, dyes, or preservatives  · pregnant or trying to get pregnant  · breast-feeding  How should I use this medicine?  This medicine is for injection under the skin. It is given by a health care professional in a hospital or clinic setting.  A special MedGuide will be given to you before each treatment. Be sure to read this information carefully each time.  For Prolia, talk to your pediatrician regarding the use of this medicine in children. Special care may be needed. For Xgeva, talk to your pediatrician regarding the use of this medicine in children. While this drug may be prescribed for children as young as 13 years for selected conditions, precautions do apply.  Overdosage: If you think you have taken too much of this medicine contact a poison control center or emergency room at once.  NOTE: This medicine is only  for you. Do not share this medicine with others.  What if I miss a dose?  It is important not to miss your dose. Call your doctor or health care professional if you are unable to keep an appointment.  What may interact with this medicine?  Do not take this medicine with any of the following medications:  · other medicines containing denosumab  This medicine may also interact with the following medications:  · medicines that lower your chance of fighting infection  · steroid medicines like prednisone or cortisone  This list may not describe all possible interactions. Give your health care provider a list of all the medicines, herbs, non-prescription drugs, or dietary supplements you use. Also tell them if you smoke, drink alcohol, or use illegal drugs. Some items may interact with your medicine.  What should I watch for while using this medicine?  Visit your doctor or health care professional for regular checks on your progress. Your doctor or health care professional may order blood tests and other tests to see how you are doing.  Call your doctor or health care professional for advice if you get a fever, chills or sore throat, or other symptoms of a cold or flu. Do not treat yourself. This drug may decrease your body's ability to fight infection. Try to avoid being around people who are sick.  You should make sure you get enough calcium and vitamin D while you are taking this medicine, unless your doctor tells you not to. Discuss the foods you eat and the vitamins you take with your health care professional.  See your dentist regularly. Brush and floss your teeth as directed. Before you have any dental work done, tell your dentist you are receiving this medicine.  Do not become pregnant while taking this medicine or for 5 months after stopping it. Talk with your doctor or health care professional about your birth control options while taking this medicine. Women should inform their doctor if they wish to become  "pregnant or think they might be pregnant. There is a potential for serious side effects to an unborn child. Talk to your health care professional or pharmacist for more information.  What side effects may I notice from receiving this medicine?  Side effects that you should report to your doctor or health care professional as soon as possible:  · allergic reactions like skin rash, itching or hives, swelling of the face, lips, or tongue  · bone pain  · breathing problems  · dizziness  · jaw pain, especially after dental work  · redness, blistering, peeling of the skin  · signs and symptoms of infection like fever or chills; cough; sore throat; pain or trouble passing urine  · signs of low calcium like fast heartbeat, muscle cramps or muscle pain; pain, tingling, numbness in the hands or feet; seizures  · unusual bleeding or bruising  · unusually weak or tired  Side effects that usually do not require medical attention (report to your doctor or health care professional if they continue or are bothersome):  · constipation  · diarrhea  · headache  · joint pain  · loss of appetite  · muscle pain  · runny nose  · tiredness  · upset stomach  This list may not describe all possible side effects. Call your doctor for medical advice about side effects. You may report side effects to FDA at 9-226-FDA-3635.  Where should I keep my medicine?  This medicine is only given in a clinic, doctor's office, or other health care setting and will not be stored at home.  NOTE: This sheet is a summary. It may not cover all possible information. If you have questions about this medicine, talk to your doctor, pharmacist, or health care provider.  © 2021 Elsevier/Gold Standard (2019-04-26 16:10:44)   if you have any problems or concerns.    We know you have a Choice in healthcare and appreciate you using [facility].  Our purpose is to provide you \"Excellent Care\".  We hope that you will always choose us in the future and continue " to recommend us to your family and friends.        Phosphate Test  Why am I having this test?  Phosphorus is a mineral that your body needs for bone growth as well as muscle and nerve function. When you eat foods that contain phosphorus, the phosphorus combines with oxygen in your intestines to form a substance called phosphate. The phosphate test may also be called a:  · Serum phosphorus test.  · Serum phosphate test.  · Inorganic phosphorus test.  · Inorganic phosphate test.  You may have this test:  · If you have possible symptoms of having too much phosphate (hyperphosphatemia) or too little phosphate (hypophosphatemia).  · To monitor treatment for diabetes or kidney disease.  · If you have abnormal amounts of calcium in your blood.  · If you are taking calcium or phosphate supplements.  What is being tested?  This test measures the amount of phosphate in the blood.  What kind of sample is taken?    A blood sample is required for this test. It is usually collected by inserting a needle into a blood vessel.  How do I prepare for this test?  Follow instructions from your health care provider about eating or drinking restrictions. You may need to stop eating and drinking everything except water starting 12 hours before your test.  How are the results reported?  Your test results will be reported as a value that indicates how much phosphate is in your blood. This will be given as milligrams of phosphate per deciliter of blood (mg/dL) or as millimoles per liter (mmol/L).  Your health care provider will compare your results to normal ranges that were established after testing a large group of people (reference ranges). Reference ranges may vary among labs and hospitals. For this test, common reference ranges are:  · Adult: 3.0-4.5 mg/dL or 0.97-1.45 mmol/L (SI units).  · Child: 4.5-6.5 mg/dL or 1.45-2.1 mmol/L (SI units).  · : 4.3-9.3 mg/dL or 1.4-3.0 mmol/L (SI units).  What do the results mean?  A result  within your reference range is considered normal, meaning that you have a normal amount of phosphate in your blood. This may mean that your diabetes or kidney treatment is working effectively, if applicable.  A result that is higher than your reference range means that there is too much phosphate in your blood. This may result from:  · Hypoparathyroidism. This is a condition that means you do not make enough of a certain type of hormone (parathyroid hormone, or PTH).  · Kidney failure.  · Increased bone size due to excessive growth hormone (acromegaly).  · Cancer.  · Organ inflammation (sarcoidosis).  · Low calcium levels (hypocalcemia).  · Acid imbalance in your blood.  · Rapid breakdown of muscle tissue (rhabdomyolysis).  · Lack of red blood cells due to rapid cell destruction (hemolytic anemia).  · Eating too much phosphorus in your diet.  · Having a medical treatment that increases your phosphate levels, such as an IV infusion.  · A condition in which your body produces too much acid or fails to remove acid from your body (acidosis).  A result that is lower than your reference range means that there is too little phosphate in your blood. This may result from:  · Hyperparathyroidism. This is a rare condition that means you make too much of a certain type of hormone (parathyroid hormone, or PTH).  · Frequent use of antacid medicines.  · High calcium levels (hypercalcemia).  · Not eating enough phosphorous in your diet.  · Long-term alcohol abuse.  · Lack (deficiency) of vitamin D.  · Treatment for low blood sugar (glucose), or having excess insulin in the body (hyperinsulinemia). Your diabetes treatment plan may need to be adjusted, if applicable.  · Not getting enough nutrients in your diet (malnutrition).  · An excessive amount of bases in the body (alkalosis). This is the opposite of acidosis.  · Bloodstream infection (sepsis).  There are many possible causes of high or low phosphate levels. Talk with your  health care provider about what your results mean.  Questions to ask your health care provider  Ask your health care provider, or the department that is doing the test:  · When will my results be ready?  · How will I get my results?  · What are my treatment options?  · What other tests do I need?  · What are my next steps?  Summary  · Phosphorus is a mineral that your body needs for bone growth as well as muscle and nerve function.  · When you eat foods that contain phosphorus, the phosphorus combines with oxygen in your intestines to form a substance called phosphate.  · Results within your reference range are considered normal, meaning that you have a normal amount of phosphate in your blood.  · A number of different conditions lead to high or low phosphate levels.  · Talk with your health care provider about what your results mean.  This information is not intended to replace advice given to you by your health care provider. Make sure you discuss any questions you have with your health care provider.  Document Revised: 09/06/2018 Document Reviewed: 09/06/2018  Elsevier Patient Education © 2021 [employer]

## 2021-07-19 ENCOUNTER — TRANSCRIBE ORDERS (OUTPATIENT)
Dept: ADMINISTRATIVE | Facility: HOSPITAL | Age: 66
End: 2021-07-19

## 2021-07-19 DIAGNOSIS — Z12.31 VISIT FOR SCREENING MAMMOGRAM: Primary | ICD-10-CM

## 2021-08-10 ENCOUNTER — HOSPITAL ENCOUNTER (OUTPATIENT)
Dept: MAMMOGRAPHY | Facility: HOSPITAL | Age: 66
Discharge: HOME | End: 2021-08-10
Admitting: FAMILY MEDICINE

## 2021-08-10 DIAGNOSIS — Z12.31 VISIT FOR SCREENING MAMMOGRAM: ICD-10-CM

## 2021-08-10 DIAGNOSIS — R92.8 ABNORMAL SCREENING MAMMOGRAM: Primary | ICD-10-CM

## 2021-08-10 PROCEDURE — 77067 SCR MAMMO BI INCL CAD: CPT

## 2021-08-10 PROCEDURE — 77063 BREAST TOMOSYNTHESIS BI: CPT

## 2021-08-16 ENCOUNTER — OFFICE VISIT (OUTPATIENT)
Dept: FAMILY MEDICINE CLINIC | Facility: CLINIC | Age: 66
End: 2021-08-16

## 2021-08-16 VITALS
HEART RATE: 63 BPM | HEIGHT: 62 IN | OXYGEN SATURATION: 99 % | TEMPERATURE: 97.2 F | BODY MASS INDEX: 19.58 KG/M2 | WEIGHT: 106.4 LBS | SYSTOLIC BLOOD PRESSURE: 110 MMHG | DIASTOLIC BLOOD PRESSURE: 64 MMHG

## 2021-08-16 DIAGNOSIS — Z00.00 MEDICARE ANNUAL WELLNESS VISIT, SUBSEQUENT: Primary | ICD-10-CM

## 2021-08-16 DIAGNOSIS — D72.829 LEUKOCYTOSIS, UNSPECIFIED TYPE: ICD-10-CM

## 2021-08-16 DIAGNOSIS — M81.0 AGE-RELATED OSTEOPOROSIS WITHOUT CURRENT PATHOLOGICAL FRACTURE: ICD-10-CM

## 2021-08-16 DIAGNOSIS — E78.2 MIXED HYPERLIPIDEMIA: ICD-10-CM

## 2021-08-16 DIAGNOSIS — F17.210 CIGARETTE NICOTINE DEPENDENCE WITHOUT COMPLICATION: ICD-10-CM

## 2021-08-16 PROCEDURE — 1170F FXNL STATUS ASSESSED: CPT | Performed by: FAMILY MEDICINE

## 2021-08-16 PROCEDURE — 1160F RVW MEDS BY RX/DR IN RCRD: CPT | Performed by: FAMILY MEDICINE

## 2021-08-16 PROCEDURE — 1126F AMNT PAIN NOTED NONE PRSNT: CPT | Performed by: FAMILY MEDICINE

## 2021-08-16 PROCEDURE — 99213 OFFICE O/P EST LOW 20 MIN: CPT | Performed by: FAMILY MEDICINE

## 2021-08-16 PROCEDURE — G0439 PPPS, SUBSEQ VISIT: HCPCS | Performed by: FAMILY MEDICINE

## 2021-08-16 NOTE — PROGRESS NOTES
The ABCs of the Annual Wellness Visit  Subsequent Medicare Wellness Visit    Chief Complaint   Patient presents with   • Medicare Wellness-subsequent       Subjective   History of Present Illness:  Violeta Neff is a 65 y.o. female who presents for a Subsequent Medicare Wellness Visit.  65-year-old white female with subsequent Medicare wellness visit as well as for further medical management of several medical issues.  Patient with hyperlipidemia and trying to manage this with dietary measures alone.  She is currently fasting prepared for lab work.  She continues to smoke cigarettes and has been doing so for more than 40 years.  She states that she smokes half a pack per day.  She also has osteoporosis and has been using Prolia.  Her last DEXA scan was in 2019.  She does have leukocytosis thought to be secondary to her tobacco use.  She has had a recent mammogram with a new opacity and is scheduled for coned compression views as well as ultrasound in 1 week.  She is up-to-date with colonoscopy.  Immunizations were reviewed with recommendations-patient will consider and contact this office when she is ready to have these.  She is otherwise doing well and has no acute complaints.    HEALTH RISK ASSESSMENT    Recent Hospitalizations:  No hospitalization(s) within the last year.    Current Medical Providers:  Patient Care Team:  Baudilio Nicole DO as PCP - General (Family Medicine)    Smoking Status:  Social History     Tobacco Use   Smoking Status Current Every Day Smoker   • Packs/day: 0.50   • Types: Cigarettes   Smokeless Tobacco Never Used       Alcohol Consumption:  Social History     Substance and Sexual Activity   Alcohol Use No       Depression Screen:   PHQ-2/PHQ-9 Depression Screening 8/16/2021   Little interest or pleasure in doing things 0   Feeling down, depressed, or hopeless 0   Total Score 0       Fall Risk Screen:  STEADI Fall Risk Assessment was completed, and patient is at LOW risk for  falls.Assessment completed on:8/16/2021    Health Habits and Functional and Cognitive Screening:  Functional & Cognitive Status 8/16/2021   Do you have difficulty preparing food and eating? No   Do you have difficulty bathing yourself, getting dressed or grooming yourself? No   Do you have difficulty using the toilet? No   Do you have difficulty moving around from place to place? No   Do you have trouble with steps or getting out of a bed or a chair? No   Current Diet -   Dental Exam -   Eye Exam -   Exercise (times per week) -   Current Exercise Activities Include -   Do you need help using the phone?  No   Are you deaf or do you have serious difficulty hearing?  No   Do you need help with transportation? No   Do you need help shopping? No   Do you need help preparing meals?  No   Do you need help with housework?  No   Do you need help with laundry? No   Do you need help taking your medications? No   Do you need help managing money? No   Do you ever drive or ride in a car without wearing a seat belt? No   Have you felt unusual stress, anger or loneliness in the last month? -   Who do you live with? -   If you need help, do you have trouble finding someone available to you? -   Do you have difficulty concentrating, remembering or making decisions? -         Does the patient have evidence of cognitive impairment? No    Asprin use counseling:Does not need ASA (and currently is not on it)    Age-appropriate Screening Schedule:  Refer to the list below for future screening recommendations based on patient's age, sex and/or medical conditions. Orders for these recommended tests are listed in the plan section. The patient has been provided with a written plan.    Health Maintenance   Topic Date Due   • TDAP/TD VACCINES (1 - Tdap) Never done   • ZOSTER VACCINE (2 of 3) 09/22/2017   • PAP SMEAR  04/12/2020   • LIPID PANEL  03/05/2021   • DXA SCAN  06/07/2021   • INFLUENZA VACCINE  10/01/2021   • MAMMOGRAM  08/10/2023       "    The following portions of the patient's history were reviewed and updated as appropriate: allergies, current medications, past family history, past medical history, past social history and past surgical history.    Outpatient Medications Prior to Visit   Medication Sig Dispense Refill   • Calcium Carbonate-Vitamin D (CALTRATE 600+D PO) Take  by mouth 3 (Three) Times a Day.     • ibuprofen (ADVIL,MOTRIN) 400 MG tablet Take 400 mg by mouth Every 6 (Six) Hours As Needed for Mild Pain .     • multivitamins-minerals (PRESERVISION AREDS 2) capsule capsule Take 1 capsule by mouth 2 (Two) Times a Day.       Facility-Administered Medications Prior to Visit   Medication Dose Route Frequency Provider Last Rate Last Admin   • denosumab (PROLIA) syringe 60 mg  60 mg Subcutaneous Once Baudilio Nicole DO           Patient Active Problem List   Diagnosis   • Age-related osteoporosis without current pathological fracture   • Mixed hyperlipidemia   • Closed displaced fracture of second metatarsal bone of left foot   • Leukocytosis   • Cigarette nicotine dependence without complication       Advanced Care Planning:  ACP discussion was held with the patient during this visit. Patient does not have an advance directive, information provided.    Review of Systems   Constitutional:        Nicotine addiction   Cardiovascular:        Hyperlipidemia   Musculoskeletal:        Osteoporosis   All other systems reviewed and are negative.      Compared to one year ago, the patient feels her physical health is the same.  Compared to one year ago, the patient feels her mental health is the same.    Reviewed chart for potential of high risk medication in the elderly: no  Reviewed chart for potential of harmful drug interactions in the elderly:no    Objective         Vitals:    08/16/21 0829   BP: 110/64   Pulse: 63   Temp: 97.2 °F (36.2 °C)   SpO2: 99%   Weight: 48.3 kg (106 lb 6.4 oz)   Height: 157.5 cm (62.01\")       Body mass index is 19.46 " kg/m².  Discussed the patient's BMI with her. The BMI is in the acceptable range.    Physical Exam  Vitals and nursing note reviewed.   Constitutional:       Appearance: Normal appearance. She is well-developed and well-groomed.   HENT:      Head: Normocephalic and atraumatic.   Neck:      Thyroid: No thyroid mass or thyromegaly.      Vascular: Normal carotid pulses. No carotid bruit.      Trachea: Trachea and phonation normal.   Cardiovascular:      Rate and Rhythm: Normal rate and regular rhythm.      Heart sounds: Normal heart sounds. No murmur heard.   No friction rub. No gallop.    Pulmonary:      Effort: Pulmonary effort is normal. No respiratory distress.      Breath sounds: Normal breath sounds. No decreased breath sounds, wheezing, rhonchi or rales.   Musculoskeletal:      Cervical back: Neck supple.   Lymphadenopathy:      Cervical: No cervical adenopathy.   Skin:     General: Skin is warm and dry.      Findings: No rash.   Neurological:      Mental Status: She is alert and oriented to person, place, and time.   Psychiatric:         Attention and Perception: Attention and perception normal.         Mood and Affect: Mood and affect normal.         Speech: Speech normal.         Behavior: Behavior normal. Behavior is cooperative.         Thought Content: Thought content normal.         Cognition and Memory: Cognition and memory normal.         Judgment: Judgment normal.               Assessment/Plan   Medicare Risks and Personalized Health Plan  CMS Preventative Services Quick Reference  Advance Directive Discussion  Breast Cancer/Mammogram Screening  Cardiovascular risk  Colon Cancer Screening  Dementia/Memory   Depression/Dysphoria  Diabetic Lab Screening   Fall Risk  Hearing Problem  Immunizations Discussed/Encouraged (specific immunizations; Tdap, Influenza, Pneumococcal 23 and Shingrix )  Inactivity/Sedentary  Lung Cancer Risk  Osteoporosis Risk    The above risks/problems have been discussed with the  patient.  Pertinent information has been shared with the patient in the After Visit Summary.  Follow up plans and orders are seen below in the Assessment/Plan Section.    Diagnoses and all orders for this visit:    1. Medicare annual wellness visit, subsequent (Primary)  -     CBC w AUTO Differential  -     Comprehensive metabolic panel  -     Lipid panel  -     Vitamin D 25 hydroxy  -     TSH    2. Mixed hyperlipidemia  -     CBC w AUTO Differential  -     Comprehensive metabolic panel  -     Lipid panel  -     Vitamin D 25 hydroxy  -     TSH    3. Leukocytosis, unspecified type  -     CBC w AUTO Differential  -     Comprehensive metabolic panel  -     Lipid panel  -     Vitamin D 25 hydroxy  -     TSH    4. Age-related osteoporosis without current pathological fracture  -     CBC w AUTO Differential  -     Comprehensive metabolic panel  -     Lipid panel  -     Vitamin D 25 hydroxy  -     TSH  -     DEXA Bone Density Axial; Future    5. Cigarette nicotine dependence without complication  -     CBC w AUTO Differential  -     Comprehensive metabolic panel  -     Lipid panel  -     Vitamin D 25 hydroxy  -     TSH    Patient has been recommended to acquire Tdap, Shingrix, as well as Pneumovax and flu shot when it is available.  She has been recommended to use seatbelts when driving a car.  Smoking cessation was also discussed.  Follow Up:  Return in about 6 months (around 2/16/2022) for Recheck.     An After Visit Summary and PPPS were given to the patient.

## 2021-08-17 LAB
25(OH)D3+25(OH)D2 SERPL-MCNC: 65 NG/ML (ref 30–100)
ALBUMIN SERPL-MCNC: 4.4 G/DL (ref 3.5–5.2)
ALBUMIN/GLOB SERPL: 2 G/DL
ALP SERPL-CCNC: 56 U/L (ref 39–117)
ALT SERPL-CCNC: 12 U/L (ref 1–33)
AST SERPL-CCNC: 20 U/L (ref 1–32)
BASOPHILS # BLD AUTO: 0.1 10*3/MM3 (ref 0–0.2)
BASOPHILS NFR BLD AUTO: 1.3 % (ref 0–1.5)
BILIRUB SERPL-MCNC: 0.2 MG/DL (ref 0–1.2)
BUN SERPL-MCNC: 19 MG/DL (ref 8–23)
BUN/CREAT SERPL: 21.8 (ref 7–25)
CALCIUM SERPL-MCNC: 9.2 MG/DL (ref 8.6–10.5)
CHLORIDE SERPL-SCNC: 109 MMOL/L (ref 98–107)
CHOLEST SERPL-MCNC: 226 MG/DL (ref 0–200)
CO2 SERPL-SCNC: 23.1 MMOL/L (ref 22–29)
CREAT SERPL-MCNC: 0.87 MG/DL (ref 0.57–1)
EOSINOPHIL # BLD AUTO: 0.74 10*3/MM3 (ref 0–0.4)
EOSINOPHIL NFR BLD AUTO: 9.5 % (ref 0.3–6.2)
ERYTHROCYTE [DISTWIDTH] IN BLOOD BY AUTOMATED COUNT: 13 % (ref 12.3–15.4)
GLOBULIN SER CALC-MCNC: 2.2 GM/DL
GLUCOSE SERPL-MCNC: 84 MG/DL (ref 65–99)
HCT VFR BLD AUTO: 39.7 % (ref 34–46.6)
HDLC SERPL-MCNC: 62 MG/DL (ref 40–60)
HGB BLD-MCNC: 13.2 G/DL (ref 12–15.9)
IMM GRANULOCYTES # BLD AUTO: 0.02 10*3/MM3 (ref 0–0.05)
IMM GRANULOCYTES NFR BLD AUTO: 0.3 % (ref 0–0.5)
LDLC SERPL CALC-MCNC: 151 MG/DL (ref 0–100)
LYMPHOCYTES # BLD AUTO: 2.55 10*3/MM3 (ref 0.7–3.1)
LYMPHOCYTES NFR BLD AUTO: 32.7 % (ref 19.6–45.3)
MCH RBC QN AUTO: 30.6 PG (ref 26.6–33)
MCHC RBC AUTO-ENTMCNC: 33.2 G/DL (ref 31.5–35.7)
MCV RBC AUTO: 92.1 FL (ref 79–97)
MONOCYTES # BLD AUTO: 0.58 10*3/MM3 (ref 0.1–0.9)
MONOCYTES NFR BLD AUTO: 7.4 % (ref 5–12)
NEUTROPHILS # BLD AUTO: 3.8 10*3/MM3 (ref 1.7–7)
NEUTROPHILS NFR BLD AUTO: 48.8 % (ref 42.7–76)
NRBC BLD AUTO-RTO: 0 /100 WBC (ref 0–0.2)
PLATELET # BLD AUTO: 204 10*3/MM3 (ref 140–450)
POTASSIUM SERPL-SCNC: 4.4 MMOL/L (ref 3.5–5.2)
PROT SERPL-MCNC: 6.6 G/DL (ref 6–8.5)
RBC # BLD AUTO: 4.31 10*6/MM3 (ref 3.77–5.28)
SODIUM SERPL-SCNC: 142 MMOL/L (ref 136–145)
TRIGL SERPL-MCNC: 74 MG/DL (ref 0–150)
TSH SERPL DL<=0.005 MIU/L-ACNC: 2.08 UIU/ML (ref 0.27–4.2)
VLDLC SERPL CALC-MCNC: 13 MG/DL (ref 5–40)
WBC # BLD AUTO: 7.79 10*3/MM3 (ref 3.4–10.8)

## 2021-08-23 ENCOUNTER — APPOINTMENT (OUTPATIENT)
Dept: ULTRASOUND IMAGING | Facility: HOSPITAL | Age: 66
End: 2021-08-23

## 2021-08-23 ENCOUNTER — HOSPITAL ENCOUNTER (OUTPATIENT)
Dept: MAMMOGRAPHY | Facility: HOSPITAL | Age: 66
End: 2021-08-23

## 2021-08-23 ENCOUNTER — APPOINTMENT (OUTPATIENT)
Dept: BONE DENSITY | Facility: HOSPITAL | Age: 66
End: 2021-08-23

## 2021-08-27 ENCOUNTER — TELEPHONE (OUTPATIENT)
Dept: FAMILY MEDICINE CLINIC | Facility: CLINIC | Age: 66
End: 2021-08-27

## 2021-08-27 DIAGNOSIS — R92.8 ABNORMALITY OF LEFT BREAST ON SCREENING MAMMOGRAM: Primary | ICD-10-CM

## 2021-08-27 DIAGNOSIS — R92.8 ABNORMAL SCREENING MAMMOGRAM: Primary | ICD-10-CM

## 2021-08-27 NOTE — TELEPHONE ENCOUNTER
We had ordered a diagnostic mammo and u/s of left breast, but she had to reschedule them, and the mammo got cancelled.  I have sent you the order.  He is out next week.

## 2021-08-27 NOTE — TELEPHONE ENCOUNTER
NEED A CALL BACK REGARDING  HER MAMMOGRAM AND U/S AND DEXA SCAN. SHE HAD TO RESCHEDULE AND NEEDS TO ASK SOME QUESTIONS ABOUT THIS.     [phone]

## 2021-09-02 ENCOUNTER — HOSPITAL ENCOUNTER (OUTPATIENT)
Dept: ULTRASOUND IMAGING | Facility: HOSPITAL | Age: 66
Discharge: HOME | End: 2021-09-02

## 2021-09-02 ENCOUNTER — APPOINTMENT (OUTPATIENT)
Dept: BONE DENSITY | Facility: HOSPITAL | Age: 66
End: 2021-09-02

## 2021-09-09 ENCOUNTER — HOSPITAL ENCOUNTER (OUTPATIENT)
Dept: MAMMOGRAPHY | Facility: HOSPITAL | Age: 66
Discharge: HOME | End: 2021-09-09

## 2021-09-09 ENCOUNTER — APPOINTMENT (OUTPATIENT)
Dept: BONE DENSITY | Facility: HOSPITAL | Age: 66
End: 2021-09-09

## 2021-09-09 ENCOUNTER — HOSPITAL ENCOUNTER (OUTPATIENT)
Dept: ULTRASOUND IMAGING | Facility: HOSPITAL | Age: 66
Discharge: HOME | End: 2021-09-09

## 2021-09-09 DIAGNOSIS — M81.0 AGE-RELATED OSTEOPOROSIS WITHOUT CURRENT PATHOLOGICAL FRACTURE: ICD-10-CM

## 2021-09-09 DIAGNOSIS — R92.8 ABNORMALITY OF LEFT BREAST ON SCREENING MAMMOGRAM: ICD-10-CM

## 2021-09-09 DIAGNOSIS — R92.8 ABNORMAL SCREENING MAMMOGRAM: ICD-10-CM

## 2021-09-09 PROCEDURE — 77080 DXA BONE DENSITY AXIAL: CPT

## 2021-09-09 PROCEDURE — 76642 ULTRASOUND BREAST LIMITED: CPT

## 2021-09-09 PROCEDURE — G0279 TOMOSYNTHESIS, MAMMO: HCPCS

## 2021-09-09 PROCEDURE — 77065 DX MAMMO INCL CAD UNI: CPT

## 2021-09-10 ENCOUNTER — TELEPHONE (OUTPATIENT)
Dept: FAMILY MEDICINE CLINIC | Facility: CLINIC | Age: 66
End: 2021-09-10

## 2021-09-12 NOTE — TELEPHONE ENCOUNTER
Let patient know that her osteoporosis has stabilized. Cont prolia and lets recheck Dexa scan in 1 year

## 2022-01-10 ENCOUNTER — HOSPITAL ENCOUNTER (OUTPATIENT)
Dept: INFUSION THERAPY | Facility: HOSPITAL | Age: 67
Setting detail: INFUSION SERIES
Discharge: HOME | End: 2022-01-10

## 2022-01-10 VITALS
DIASTOLIC BLOOD PRESSURE: 61 MMHG | OXYGEN SATURATION: 98 % | TEMPERATURE: 98 F | HEIGHT: 60 IN | RESPIRATION RATE: 16 BRPM | HEART RATE: 52 BPM | WEIGHT: 107 LBS | BODY MASS INDEX: 21.01 KG/M2 | SYSTOLIC BLOOD PRESSURE: 103 MMHG

## 2022-01-10 DIAGNOSIS — M81.0 AGE-RELATED OSTEOPOROSIS WITHOUT CURRENT PATHOLOGICAL FRACTURE: Primary | ICD-10-CM

## 2022-01-10 PROCEDURE — 85025 COMPLETE CBC W/AUTO DIFF WBC: CPT | Performed by: FAMILY MEDICINE

## 2022-01-10 PROCEDURE — 83735 ASSAY OF MAGNESIUM: CPT | Performed by: FAMILY MEDICINE

## 2022-01-10 PROCEDURE — 96372 THER/PROPH/DIAG INJ SC/IM: CPT

## 2022-01-10 PROCEDURE — 84100 ASSAY OF PHOSPHORUS: CPT | Performed by: FAMILY MEDICINE

## 2022-01-10 PROCEDURE — 36415 COLL VENOUS BLD VENIPUNCTURE: CPT

## 2022-01-10 PROCEDURE — 25010000002 DENOSUMAB 60 MG/ML SOLUTION PREFILLED SYRINGE: Performed by: FAMILY MEDICINE

## 2022-01-10 PROCEDURE — 80053 COMPREHEN METABOLIC PANEL: CPT | Performed by: FAMILY MEDICINE

## 2022-01-10 RX ORDER — DENOSUMAB 60 MG/ML
60 INJECTION SUBCUTANEOUS
COMMUNITY

## 2022-01-10 RX ADMIN — DENOSUMAB 60 MG: 60 INJECTION SUBCUTANEOUS at 10:57

## 2022-01-10 NOTE — PATIENT INSTRUCTIONS
"  Call Dr. Baudilio Nicole [facility] at [phone] if you have any problems or concerns.    We know you have a Choice in healthcare and appreciate you using [facility].  Our purpose is to provide you \"Excellent Care\".  We hope that you will always choose us in the future and continue to recommend us to your family and friends.              "

## 2022-01-10 NOTE — NURSING NOTE
NURSING PROGRESS NOTE:    PATIENT ARRIVED AMBULATORY TO [facility] AT 1000 FOR SCHEDULED INJECTION.  LABS DRAWN.  PROCEDURE PERFORMED WITHOUT INCIDENT.  DISCHARGED HOME AT 1105. AVS REVIEWED AND A COPY PROVIDED.   EBONI NEW

## 2022-03-18 ENCOUNTER — TELEPHONE (OUTPATIENT)
Dept: FAMILY MEDICINE CLINIC | Facility: CLINIC | Age: 67
End: 2022-03-18

## 2022-03-18 NOTE — TELEPHONE ENCOUNTER
PATIENT IS NEEDING A NEW ORDER FOR HER PROLEA SHOTS THAT SHE GETS AT THE HOSPITAL. SHE WILL DUE FOR ANOTHER SHOT IN July.    PLEASE ADVISE  [phone]

## 2022-03-21 NOTE — TELEPHONE ENCOUNTER
Asked patient to call us back early June and he will enter the Therapy Plan.  Due to time constraints he didn't want to do it now.

## 2022-06-06 ENCOUNTER — TELEPHONE (OUTPATIENT)
Dept: FAMILY MEDICINE CLINIC | Facility: CLINIC | Age: 67
End: 2022-06-06

## 2022-06-06 NOTE — TELEPHONE ENCOUNTER
Caller: Violeta Neff    Relationship: Self    Best call back number: [phone]     What orders are you requesting (i.e. lab or imaging): PROLIA INJECTION     Additional notes: PATIENT STATED SHE WAS TOLD TO CALL IN June SO THAT DR ANDERSON CAN PUT THE ORDERS IN. PATIENT IS SCHEDULED FOR THE INJECTION ON 07/10/2022. PATIENT IS ALSO ASKING IF DR ANDERSON WOULD LIKE HER TO GET A BONE DENSITY TEST. PLEASE ADVISE.

## 2022-06-07 DIAGNOSIS — M81.0 AGE-RELATED OSTEOPOROSIS WITHOUT CURRENT PATHOLOGICAL FRACTURE: Primary | ICD-10-CM

## 2022-07-11 ENCOUNTER — HOSPITAL ENCOUNTER (OUTPATIENT)
Dept: INFUSION THERAPY | Facility: HOSPITAL | Age: 67
Discharge: HOME | End: 2022-07-11
Admitting: FAMILY MEDICINE

## 2022-07-11 VITALS
WEIGHT: 105 LBS | OXYGEN SATURATION: 100 % | DIASTOLIC BLOOD PRESSURE: 64 MMHG | BODY MASS INDEX: 19.32 KG/M2 | SYSTOLIC BLOOD PRESSURE: 101 MMHG | HEIGHT: 62 IN | RESPIRATION RATE: 16 BRPM | HEART RATE: 59 BPM | TEMPERATURE: 96.7 F

## 2022-07-11 DIAGNOSIS — M81.0 AGE-RELATED OSTEOPOROSIS WITHOUT CURRENT PATHOLOGICAL FRACTURE: Primary | ICD-10-CM

## 2022-07-11 LAB
25(OH)D3 SERPL-MCNC: 48.8 NG/ML (ref 30–100)
ALBUMIN SERPL-MCNC: 4.2 G/DL (ref 3.5–5.2)
ALBUMIN/GLOB SERPL: 1.8 G/DL
ALP SERPL-CCNC: 53 U/L (ref 39–117)
ALT SERPL W P-5'-P-CCNC: 14 U/L (ref 1–33)
ANION GAP SERPL CALCULATED.3IONS-SCNC: 9.4 MMOL/L (ref 5–15)
AST SERPL-CCNC: 16 U/L (ref 1–32)
BILIRUB SERPL-MCNC: 0.2 MG/DL (ref 0–1.2)
BUN SERPL-MCNC: 25 MG/DL (ref 8–23)
BUN/CREAT SERPL: 26 (ref 7–25)
CALCIUM SPEC-SCNC: 9.6 MG/DL (ref 8.6–10.5)
CHLORIDE SERPL-SCNC: 109 MMOL/L (ref 98–107)
CO2 SERPL-SCNC: 25.6 MMOL/L (ref 22–29)
CREAT SERPL-MCNC: 0.96 MG/DL (ref 0.57–1)
EGFRCR SERPLBLD CKD-EPI 2021: 65 ML/MIN/1.73
GLOBULIN UR ELPH-MCNC: 2.4 GM/DL
GLUCOSE SERPL-MCNC: 94 MG/DL (ref 65–99)
MAGNESIUM SERPL-MCNC: 2.2 MG/DL (ref 1.6–2.4)
PHOSPHATE SERPL-MCNC: 3.5 MG/DL (ref 2.5–4.5)
POTASSIUM SERPL-SCNC: 4 MMOL/L (ref 3.5–5.2)
PROT SERPL-MCNC: 6.6 G/DL (ref 6–8.5)
SODIUM SERPL-SCNC: 144 MMOL/L (ref 136–145)

## 2022-07-11 PROCEDURE — 82306 VITAMIN D 25 HYDROXY: CPT | Performed by: FAMILY MEDICINE

## 2022-07-11 PROCEDURE — 83735 ASSAY OF MAGNESIUM: CPT | Performed by: FAMILY MEDICINE

## 2022-07-11 PROCEDURE — 36415 COLL VENOUS BLD VENIPUNCTURE: CPT

## 2022-07-11 PROCEDURE — 25010000002 DENOSUMAB 60 MG/ML SOLUTION PREFILLED SYRINGE: Performed by: FAMILY MEDICINE

## 2022-07-11 PROCEDURE — 80053 COMPREHEN METABOLIC PANEL: CPT | Performed by: FAMILY MEDICINE

## 2022-07-11 PROCEDURE — 96372 THER/PROPH/DIAG INJ SC/IM: CPT

## 2022-07-11 PROCEDURE — 84100 ASSAY OF PHOSPHORUS: CPT | Performed by: FAMILY MEDICINE

## 2022-07-11 RX ADMIN — DENOSUMAB 60 MG: 60 INJECTION SUBCUTANEOUS at 09:21

## 2022-07-11 NOTE — NURSING NOTE
Patient arrived to [facility] at 0814 for scheduled appointment.  Labs drawn, medications and history reviewed. Medication administered as ordered without complications.  AVS discussed and copy given to patient.  Patient discharged in stable condition without complaints at 0932

## 2022-07-11 NOTE — PATIENT INSTRUCTIONS
"  Call [facility] at [phone] if you have any problems or concerns.    We know you have a Choice in healthcare and appreciate you using [facility].  Our purpose is to provide you \"Excellent Care\".  We hope that you will always choose us in the future and continue to recommend us to your family and friends.             " [With new medications prescribed] : Treat in place: with new medications prescribed [Primary Care/Internal Medicine/PMD] : Primary Care/Internal Medicine/PMD [Pulmonology] : Pulmonology [FreeTextEntry1] : 54 yo F with asthma and hypothyroidism presenting with3 days of covid and unvaccinated not in distress \par \par -- supportive care \par steroid\par albuterol \par paxlvid

## 2022-10-12 DIAGNOSIS — E78.2 MIXED HYPERLIPIDEMIA: Primary | ICD-10-CM

## 2023-01-05 ENCOUNTER — TRANSCRIBE ORDERS (OUTPATIENT)
Dept: ADMINISTRATIVE | Facility: HOSPITAL | Age: 68
End: 2023-01-05
Payer: MEDICARE

## 2023-01-05 DIAGNOSIS — Z12.31 SCREENING MAMMOGRAM, ENCOUNTER FOR: Primary | ICD-10-CM

## 2023-01-12 ENCOUNTER — HOSPITAL ENCOUNTER (OUTPATIENT)
Dept: INFUSION THERAPY | Facility: HOSPITAL | Age: 68
Setting detail: INFUSION SERIES
Discharge: HOME | End: 2023-01-12
Payer: MEDICARE

## 2023-01-12 VITALS
HEIGHT: 62 IN | BODY MASS INDEX: 19.32 KG/M2 | WEIGHT: 105 LBS | DIASTOLIC BLOOD PRESSURE: 67 MMHG | TEMPERATURE: 96.8 F | SYSTOLIC BLOOD PRESSURE: 115 MMHG | OXYGEN SATURATION: 100 % | HEART RATE: 55 BPM | RESPIRATION RATE: 16 BRPM

## 2023-01-12 DIAGNOSIS — M81.0 AGE-RELATED OSTEOPOROSIS WITHOUT CURRENT PATHOLOGICAL FRACTURE: Primary | ICD-10-CM

## 2023-01-12 LAB
25(OH)D3 SERPL-MCNC: 57.5 NG/ML (ref 30–100)
ALBUMIN SERPL-MCNC: 4.2 G/DL (ref 3.5–5.2)
ALBUMIN/GLOB SERPL: 1.5 G/DL
ALP SERPL-CCNC: 52 U/L (ref 39–117)
ALT SERPL W P-5'-P-CCNC: 13 U/L (ref 1–33)
ANION GAP SERPL CALCULATED.3IONS-SCNC: 6.5 MMOL/L (ref 5–15)
AST SERPL-CCNC: 18 U/L (ref 1–32)
BILIRUB SERPL-MCNC: 0.2 MG/DL (ref 0–1.2)
BUN SERPL-MCNC: 19 MG/DL (ref 8–23)
BUN/CREAT SERPL: 20.7 (ref 7–25)
CALCIUM SPEC-SCNC: 9.7 MG/DL (ref 8.6–10.5)
CHLORIDE SERPL-SCNC: 106 MMOL/L (ref 98–107)
CO2 SERPL-SCNC: 27.5 MMOL/L (ref 22–29)
CREAT SERPL-MCNC: 0.92 MG/DL (ref 0.57–1)
EGFRCR SERPLBLD CKD-EPI 2021: 68 ML/MIN/1.73
GLOBULIN UR ELPH-MCNC: 2.8 GM/DL
GLUCOSE SERPL-MCNC: 98 MG/DL (ref 65–99)
MAGNESIUM SERPL-MCNC: 2.1 MG/DL (ref 1.6–2.4)
PHOSPHATE SERPL-MCNC: 3.4 MG/DL (ref 2.5–4.5)
POTASSIUM SERPL-SCNC: 4.3 MMOL/L (ref 3.5–5.2)
PROT SERPL-MCNC: 7 G/DL (ref 6–8.5)
SODIUM SERPL-SCNC: 140 MMOL/L (ref 136–145)

## 2023-01-12 PROCEDURE — 96372 THER/PROPH/DIAG INJ SC/IM: CPT

## 2023-01-12 PROCEDURE — 36415 COLL VENOUS BLD VENIPUNCTURE: CPT

## 2023-01-12 PROCEDURE — 82306 VITAMIN D 25 HYDROXY: CPT | Performed by: FAMILY MEDICINE

## 2023-01-12 PROCEDURE — 80053 COMPREHEN METABOLIC PANEL: CPT | Performed by: FAMILY MEDICINE

## 2023-01-12 PROCEDURE — 25010000002 DENOSUMAB 60 MG/ML SOLUTION PREFILLED SYRINGE: Performed by: FAMILY MEDICINE

## 2023-01-12 PROCEDURE — 84100 ASSAY OF PHOSPHORUS: CPT | Performed by: FAMILY MEDICINE

## 2023-01-12 PROCEDURE — 83735 ASSAY OF MAGNESIUM: CPT | Performed by: FAMILY MEDICINE

## 2023-01-12 RX ADMIN — DENOSUMAB 60 MG: 60 INJECTION SUBCUTANEOUS at 09:06

## 2023-01-12 NOTE — NURSING NOTE
NURSE PROGRESS NOTE    PT. ARRIVED @ [facility] FOR SCHEDULED INJECTION AT 0830 .  INJECTION WAS PERFORMED WITHOUT INCIDENT.  PT. DISCHARGED TO HOME AT 0915.

## 2023-01-12 NOTE — PATIENT INSTRUCTIONS
"  Call [facility] at [phone] if you have any problems or concerns.    We know you have a Choice in healthcare and appreciate you using [facility].  Our purpose is to provide you \"Excellent Care\".  We hope that you will always choose us in the future and continue to recommend us to your family and friends.             "

## 2023-01-17 ENCOUNTER — OFFICE VISIT (OUTPATIENT)
Dept: FAMILY MEDICINE CLINIC | Facility: CLINIC | Age: 68
End: 2023-01-17
Payer: MEDICARE

## 2023-01-17 ENCOUNTER — HOSPITAL ENCOUNTER (OUTPATIENT)
Dept: MAMMOGRAPHY | Facility: HOSPITAL | Age: 68
Discharge: HOME | End: 2023-01-17
Admitting: FAMILY MEDICINE
Payer: MEDICARE

## 2023-01-17 VITALS
TEMPERATURE: 97.5 F | HEART RATE: 61 BPM | HEIGHT: 62 IN | DIASTOLIC BLOOD PRESSURE: 80 MMHG | WEIGHT: 99.9 LBS | OXYGEN SATURATION: 100 % | BODY MASS INDEX: 18.38 KG/M2 | SYSTOLIC BLOOD PRESSURE: 128 MMHG

## 2023-01-17 DIAGNOSIS — F17.210 CIGARETTE NICOTINE DEPENDENCE WITHOUT COMPLICATION: ICD-10-CM

## 2023-01-17 DIAGNOSIS — R82.90 ABNORMAL URINALYSIS: ICD-10-CM

## 2023-01-17 DIAGNOSIS — Z12.31 SCREENING MAMMOGRAM, ENCOUNTER FOR: ICD-10-CM

## 2023-01-17 DIAGNOSIS — Z00.00 MEDICARE ANNUAL WELLNESS VISIT, SUBSEQUENT: Primary | ICD-10-CM

## 2023-01-17 DIAGNOSIS — M81.0 AGE-RELATED OSTEOPOROSIS WITHOUT CURRENT PATHOLOGICAL FRACTURE: ICD-10-CM

## 2023-01-17 DIAGNOSIS — E55.9 VITAMIN D DEFICIENCY: ICD-10-CM

## 2023-01-17 DIAGNOSIS — R82.90 MALODOROUS URINE: ICD-10-CM

## 2023-01-17 DIAGNOSIS — E78.2 MIXED HYPERLIPIDEMIA: ICD-10-CM

## 2023-01-17 LAB
BILIRUB BLD-MCNC: ABNORMAL MG/DL
CLARITY, POC: CLEAR
COLOR UR: YELLOW
GLUCOSE UR STRIP-MCNC: NEGATIVE MG/DL
KETONES UR QL: NEGATIVE
LEUKOCYTE EST, POC: ABNORMAL
NITRITE UR-MCNC: NEGATIVE MG/ML
PH UR: 6 [PH] (ref 5–8)
PROT UR STRIP-MCNC: NEGATIVE MG/DL
RBC # UR STRIP: NEGATIVE /UL
SP GR UR: 1.03 (ref 1–1.03)
UROBILINOGEN UR QL: NORMAL

## 2023-01-17 PROCEDURE — 77063 BREAST TOMOSYNTHESIS BI: CPT

## 2023-01-17 PROCEDURE — 1159F MED LIST DOCD IN RCRD: CPT | Performed by: FAMILY MEDICINE

## 2023-01-17 PROCEDURE — 81002 URINALYSIS NONAUTO W/O SCOPE: CPT | Performed by: FAMILY MEDICINE

## 2023-01-17 PROCEDURE — G0439 PPPS, SUBSEQ VISIT: HCPCS | Performed by: FAMILY MEDICINE

## 2023-01-17 PROCEDURE — 1170F FXNL STATUS ASSESSED: CPT | Performed by: FAMILY MEDICINE

## 2023-01-17 PROCEDURE — 1126F AMNT PAIN NOTED NONE PRSNT: CPT | Performed by: FAMILY MEDICINE

## 2023-01-17 PROCEDURE — 77067 SCR MAMMO BI INCL CAD: CPT

## 2023-01-17 PROCEDURE — 99213 OFFICE O/P EST LOW 20 MIN: CPT | Performed by: FAMILY MEDICINE

## 2023-01-17 RX ORDER — HYDROCODONE BITARTRATE AND ACETAMINOPHEN 5; 325 MG/1; MG/1
TABLET ORAL
COMMUNITY
Start: 2022-12-14 | End: 2023-01-17

## 2023-01-17 NOTE — PROGRESS NOTES
The ABCs of the Annual Wellness Visit  Subsequent Medicare Wellness Visit    Subjective    Violeta Neff is a 67 y.o. female who presents for a Subsequent Medicare Wellness Visit.    The following portions of the patient's history were reviewed and   updated as appropriate: allergies, current medications, past family history, past medical history, past social history, past surgical history and problem list.    Compared to one year ago, the patient feels her physical   health is the same.    Compared to one year ago, the patient feels her mental   health is the same.    Recent Hospitalizations:  She was not admitted to the hospital during the last year.       Current Medical Providers:  Patient Care Team:  Baudilio Nicole DO as PCP - General (Family Medicine)    Outpatient Medications Prior to Visit   Medication Sig Dispense Refill   • Calcium Carbonate-Vitamin D (CALTRATE 600+D PO) Take 1,200 mg by mouth 2 (Two) Times a Day.     • denosumab (Prolia) 60 MG/ML solution prefilled syringe syringe Inject 60 mg under the skin into the appropriate area as directed Every 6 (Six) Months.     • ibuprofen (ADVIL,MOTRIN) 400 MG tablet Take 400 mg by mouth Every 6 (Six) Hours As Needed for Mild Pain .     • multivitamins-minerals (PRESERVISION AREDS 2) capsule capsule Take 1 capsule by mouth 2 (Two) Times a Day.     • HYDROcodone-acetaminophen (NORCO) 5-325 MG per tablet        No facility-administered medications prior to visit.       No opioid medication identified on active medication list. I have reviewed chart for other potential  high risk medication/s and harmful drug interactions in the elderly.          Aspirin is not on active medication list.  Aspirin use is not indicated based on review of current medical condition/s. Risk of harm outweighs potential benefits.  .    Patient Active Problem List   Diagnosis   • Age-related osteoporosis without current pathological fracture   • Mixed hyperlipidemia   • Closed  "displaced fracture of second metatarsal bone of left foot   • Leukocytosis   • Cigarette nicotine dependence without complication   • Vitamin D deficiency     Advance Care Planning  Advance Directive is not on file.  ACP discussion was held with the patient during this visit. Patient does not have an advance directive, information provided.     Objective    Vitals:    01/17/23 1034   BP: 128/80   Pulse: 61   Temp: 97.5 °F (36.4 °C)   SpO2: 100%   Weight: 45.3 kg (99 lb 14.4 oz)   Height: 157.5 cm (62\")   PainSc: 0-No pain     Estimated body mass index is 18.27 kg/m² as calculated from the following:    Height as of this encounter: 157.5 cm (62\").    Weight as of this encounter: 45.3 kg (99 lb 14.4 oz).    BMI is below normal parameters (malnutrition). Recommendations: none (medical contraindication)      Does the patient have evidence of cognitive impairment? No          HEALTH RISK ASSESSMENT    Smoking Status:  Social History     Tobacco Use   Smoking Status Every Day   • Packs/day: 0.50   • Types: Cigarettes   Smokeless Tobacco Never     Alcohol Consumption:  Social History     Substance and Sexual Activity   Alcohol Use No     Fall Risk Screen:    STEADI Fall Risk Assessment was completed, and patient is at LOW risk for falls.Assessment completed on:1/17/2023    Depression Screening:  PHQ-2/PHQ-9 Depression Screening 1/17/2023   Little Interest or Pleasure in Doing Things 0-->not at all   Feeling Down, Depressed or Hopeless 0-->not at all   PHQ-9: Brief Depression Severity Measure Score 0       Health Habits and Functional and Cognitive Screening:  Functional & Cognitive Status 1/17/2023   Do you have difficulty preparing food and eating? No   Do you have difficulty bathing yourself, getting dressed or grooming yourself? No   Do you have difficulty using the toilet? No   Do you have difficulty moving around from place to place? No   Do you have trouble with steps or getting out of a bed or a chair? No   Current " Diet Well Balanced Diet   Dental Exam Up to date   Eye Exam Up to date   Exercise (times per week) 7 times per week   Current Exercises Include Walking   Current Exercise Activities Include -   Do you need help using the phone?  No   Are you deaf or do you have serious difficulty hearing?  No   Do you need help with transportation? No   Do you need help shopping? No   Do you need help preparing meals?  No   Do you need help with housework?  No   Do you need help with laundry? No   Do you need help taking your medications? No   Do you need help managing money? No   Do you ever drive or ride in a car without wearing a seat belt? No   Have you felt unusual stress, anger or loneliness in the last month? No   Who do you live with? Spouse   If you need help, do you have trouble finding someone available to you? No   Have you been bothered in the last four weeks by sexual problems? No   Do you have difficulty concentrating, remembering or making decisions? No       Age-appropriate Screening Schedule:  Refer to the list below for future screening recommendations based on patient's age, sex and/or medical conditions. Orders for these recommended tests are listed in the plan section. The patient has been provided with a written plan.    Health Maintenance   Topic Date Due   • TDAP/TD VACCINES (1 - Tdap) Never done   • ZOSTER VACCINE (2 of 3) 09/22/2017   • PAP SMEAR  04/12/2020   • INFLUENZA VACCINE  08/01/2022   • LIPID PANEL  08/16/2022   • MAMMOGRAM  09/09/2023   • DXA SCAN  09/09/2023                CMS Preventative Services Quick Reference  Risk Factors Identified During Encounter  Fall Risk-High or Moderate: Discussed Fall Prevention in the home  Immunizations Discussed/Encouraged: Tdap, Influenza, Prevnar 20 (Pneumococcal 20-valent conjugate) and Shingrix  Inactivity/Sedentary: Patient was advised to exercise at least 150 minutes a week per CDC recommendations.  Tobacco Use/Dependance Risk (use dotphrase  ".tobaccocessation for documentation)  Urinary Incontinence: Kegel exercises discussed  Dental Screening Recommended  Vision Screening Recommended  The above risks/problems have been discussed with the patient.  Pertinent information has been shared with the patient in the After Visit Summary.  An After Visit Summary and PPPS were made available to the patient.    Follow Up:   Next Medicare Wellness visit to be scheduled in 1 year.       Additional E&M Note during same encounter follows:  Patient has multiple medical problems which are significant and separately identifiable that require additional work above and beyond the Medicare Wellness Visit.      Chief Complaint  Medicare Wellness-subsequent    Subjective        HPI  Violeta Neff is also being seen today for 67-year-old white female with known history of hyperlipidemia, age-related osteoporosis as well as nicotine addiction here for further medical management.  Patient is receiving Prolia on an every 6-month basis.  She has just received an injection.  She does use a host of over-the-counter supplements and vitamins including vitamin D3 for vitamin D deficiency.  All medications and supplements are used appropriately and are well-tolerated without side effects.  Fasting labs have been acquired prior to this visit.  Of new complaint is foul-smelling urine.  This has been present over the last 2 weeks and has not been accompanied with other symptoms such as dysuria, frequency or urgency.  She denies flank pain and there has been no fever.  She is concerned however that she may have a urinary tract infection and be asymptomatic given her age..        Review of Systems   Endocrine:        Vitamin D deficiency   Genitourinary:        Foul-smelling urine   Musculoskeletal:        Age-related osteoporosis   All other systems reviewed and are negative.      Objective   Vital Signs:  /80   Pulse 61   Temp 97.5 °F (36.4 °C)   Ht 157.5 cm (62\")   Wt 45.3 kg " (99 lb 14.4 oz)   SpO2 100%   BMI 18.27 kg/m²     Physical Exam  Vitals and nursing note reviewed.   Constitutional:       Appearance: Normal appearance. She is well-developed and well-groomed.   HENT:      Head: Normocephalic and atraumatic.   Neck:      Thyroid: No thyroid mass or thyromegaly.      Vascular: Normal carotid pulses. No carotid bruit.      Trachea: Trachea and phonation normal.   Cardiovascular:      Rate and Rhythm: Normal rate and regular rhythm.      Heart sounds: Normal heart sounds. No murmur heard.    No friction rub. No gallop.   Pulmonary:      Effort: Pulmonary effort is normal. No respiratory distress.      Breath sounds: Normal breath sounds. No decreased breath sounds, wheezing, rhonchi or rales.   Abdominal:      General: Abdomen is flat. Bowel sounds are normal. There is no distension.      Palpations: Abdomen is soft. There is no hepatomegaly, splenomegaly or mass.      Tenderness: There is no abdominal tenderness. There is no right CVA tenderness, left CVA tenderness, guarding or rebound.      Hernia: No hernia is present.   Musculoskeletal:      Cervical back: Neck supple.   Lymphadenopathy:      Cervical: No cervical adenopathy.   Skin:     General: Skin is warm and dry.      Findings: No rash.   Neurological:      Mental Status: She is alert and oriented to person, place, and time.   Psychiatric:         Attention and Perception: Attention and perception normal.         Mood and Affect: Mood and affect normal.         Speech: Speech normal.         Behavior: Behavior normal. Behavior is cooperative.         Thought Content: Thought content normal.         Cognition and Memory: Cognition and memory normal.         Judgment: Judgment normal.        Office Visit on 01/17/2023   Component Date Value Ref Range Status   • Color 01/17/2023 Yellow  Yellow, Straw, Dark Yellow, Zahra Final   • Clarity, UA 01/17/2023 Clear  Clear Final   • Glucose, UA 01/17/2023 Negative  Negative mg/dL  Final   • Bilirubin 01/17/2023 Small (1+) (A)  Negative Final   • Ketones, UA 01/17/2023 Negative  Negative Final   • Specific Gravity  01/17/2023 1.030  1.005 - 1.030 Final   • Blood, UA 01/17/2023 Negative  Negative Final   • pH, Urine 01/17/2023 6.0  5.0 - 8.0 Final   • Protein, POC 01/17/2023 Negative  Negative mg/dL Final   • Urobilinogen, UA 01/17/2023 Normal  Normal, 0.2 E.U./dL Final   • Leukocytes 01/17/2023 Small (1+) (A)  Negative Final   • Nitrite, UA 01/17/2023 Negative  Negative Final   Hospital Outpatient Visit on 01/12/2023   Component Date Value Ref Range Status   • Glucose 01/12/2023 98  65 - 99 mg/dL Final   • BUN 01/12/2023 19  8 - 23 mg/dL Final   • Creatinine 01/12/2023 0.92  0.57 - 1.00 mg/dL Final   • Sodium 01/12/2023 140  136 - 145 mmol/L Final   • Potassium 01/12/2023 4.3  3.5 - 5.2 mmol/L Final   • Chloride 01/12/2023 106  98 - 107 mmol/L Final   • CO2 01/12/2023 27.5  22.0 - 29.0 mmol/L Final   • Calcium 01/12/2023 9.7  8.6 - 10.5 mg/dL Final   • Total Protein 01/12/2023 7.0  6.0 - 8.5 g/dL Final   • Albumin 01/12/2023 4.2  3.5 - 5.2 g/dL Final   • ALT (SGPT) 01/12/2023 13  1 - 33 U/L Final   • AST (SGOT) 01/12/2023 18  1 - 32 U/L Final   • Alkaline Phosphatase 01/12/2023 52  39 - 117 U/L Final   • Total Bilirubin 01/12/2023 0.2  0.0 - 1.2 mg/dL Final   • Globulin 01/12/2023 2.8  gm/dL Final   • A/G Ratio 01/12/2023 1.5  g/dL Final   • BUN/Creatinine Ratio 01/12/2023 20.7  7.0 - 25.0 Final   • Anion Gap 01/12/2023 6.5  5.0 - 15.0 mmol/L Final   • eGFR 01/12/2023 68.0  >60.0 mL/min/1.73 Final    National Kidney Foundation and American Society of Nephrology (ASN) Task Force recommended calculation based on the Chronic Kidney Disease Epidemiology Collaboration (CKD-EPI) equation refit without adjustment for race.   • Magnesium 01/12/2023 2.1  1.6 - 2.4 mg/dL Final   • Phosphorus 01/12/2023 3.4  2.5 - 4.5 mg/dL Final   • 25 Hydroxy, Vitamin D 01/12/2023 57.5  30.0 - 100.0 ng/ml Final          The following data was reviewed by: Baudilio Nicole DO on 01/17/2023:  Common labs    Common Labs 7/11/22 1/12/23   Glucose 94 98   BUN 25 (A) 19   Creatinine 0.96 0.92   Sodium 144 140   Potassium 4.0 4.3   Chloride 109 (A) 106   Calcium 9.6 9.7   Albumin 4.20 4.2   Total Bilirubin 0.2 0.2   Alkaline Phosphatase 53 52   AST (SGOT) 16 18   ALT (SGPT) 14 13   (A) Abnormal value            Data reviewed: none           Assessment and Plan   Diagnoses and all orders for this visit:    1. Medicare annual wellness visit, subsequent (Primary)  -     POCT urinalysis dipstick, manual  -     Comprehensive metabolic panel; Future  -     TSH; Future  -     Magnesium; Future  -     Phosphorus; Future  -     Vitamin D 25 hydroxy; Future  -     Lipid panel; Future  -     CBC w AUTO Differential; Future    2. Malodorous urine  -     Comprehensive metabolic panel; Future  -     TSH; Future  -     Magnesium; Future  -     Phosphorus; Future  -     Vitamin D 25 hydroxy; Future  -     Lipid panel; Future  -     CBC w AUTO Differential; Future    3. Mixed hyperlipidemia  -     Comprehensive metabolic panel; Future  -     TSH; Future  -     Magnesium; Future  -     Phosphorus; Future  -     Vitamin D 25 hydroxy; Future  -     Lipid panel; Future  -     CBC w AUTO Differential; Future    4. Abnormal urinalysis  -     Urine Culture - Urine, Urine, Clean Catch; Future  -     Urine Culture - Urine, Urine, Clean Catch  -     Comprehensive metabolic panel; Future  -     TSH; Future  -     Magnesium; Future  -     Phosphorus; Future  -     Vitamin D 25 hydroxy; Future  -     Lipid panel; Future  -     CBC w AUTO Differential; Future    5. Age-related osteoporosis without current pathological fracture  -     Ambulatory Referral to [facility] For Infusion Treatment  -     Comprehensive metabolic panel; Future  -     TSH; Future  -     Magnesium; Future  -     Phosphorus; Future  -     Vitamin D 25 hydroxy; Future  -     Lipid panel; Future  -      CBC w AUTO Differential; Future    6. Vitamin D deficiency  -     Comprehensive metabolic panel; Future  -     TSH; Future  -     Magnesium; Future  -     Phosphorus; Future  -     Vitamin D 25 hydroxy; Future  -     Lipid panel; Future  -     CBC w AUTO Differential; Future    7. Cigarette nicotine dependence without complication  -     Comprehensive metabolic panel; Future  -     TSH; Future  -     Magnesium; Future  -     Phosphorus; Future  -     Vitamin D 25 hydroxy; Future  -     Lipid panel; Future  -     CBC w AUTO Differential; Future    Other orders  -     denosumab (PROLIA) syringe 60 mg           I spent 30 minutes caring for Violeta on this date of service. This time includes time spent by me in the following activities:preparing for the visit, reviewing tests, obtaining and/or reviewing a separately obtained history, performing a medically appropriate examination and/or evaluation , counseling and educating the patient/family/caregiver, ordering medications, tests, or procedures, referring and communicating with other health care professionals , documenting information in the medical record, independently interpreting results and communicating that information with the patient/family/caregiver and care coordination  Follow Up   Return in about 6 months (around 7/17/2023) for Recheck.  Patient was given instructions and counseling regarding her condition or for health maintenance advice. Please see specific information pulled into the AVS if appropriate.

## 2023-01-19 LAB
BACTERIA UR CULT: NORMAL
BACTERIA UR CULT: NORMAL

## 2023-01-30 ENCOUNTER — TELEPHONE (OUTPATIENT)
Dept: FAMILY MEDICINE CLINIC | Facility: CLINIC | Age: 68
End: 2023-01-30

## 2023-01-30 NOTE — TELEPHONE ENCOUNTER
Caller: Violeta Neff    Relationship: Self    Best call back number:[phone]    What test was performed: URINE TEST    When was the test performed:1/16    PLEASE CALL. PATIENT NEVER GOT RESULTS. SHE WOULD LIKE TO SPEAK TO LUZ.

## 2023-06-05 ENCOUNTER — OFFICE VISIT (OUTPATIENT)
Dept: ORTHOPEDIC SURGERY | Facility: CLINIC | Age: 68
End: 2023-06-05
Payer: MEDICARE

## 2023-06-05 VITALS — HEIGHT: 62 IN | TEMPERATURE: 97.5 F | WEIGHT: 103.6 LBS | BODY MASS INDEX: 19.06 KG/M2

## 2023-06-05 DIAGNOSIS — R52 PAIN: Primary | ICD-10-CM

## 2023-06-05 DIAGNOSIS — S92.255A CLOSED NONDISPLACED FRACTURE OF NAVICULAR BONE OF LEFT FOOT, INITIAL ENCOUNTER: ICD-10-CM

## 2023-06-05 RX ORDER — TRAMADOL HYDROCHLORIDE 50 MG/1
50 TABLET ORAL EVERY 4 HOURS PRN
Qty: 18 TABLET | Refills: 0 | Status: SHIPPED | OUTPATIENT
Start: 2023-06-05

## 2023-06-05 NOTE — PROGRESS NOTES
Patient Name: Violeta Neff   YOB: 1955  Referring Primary Care Physician: Baudilio Nicole DO  BMI: Body mass index is 18.94 kg/m².    Chief Complaint:    Chief Complaint   Patient presents with    Left Foot - Initial Evaluation        HPI: New pt that fell Friday night on steps and hurt foot. Pt went to the ER and was placed in a boot. 2018 pt followed with [facility] for fracture. Hx of osteoporosis and is on prolia. Pt is in a cam boot and walking stick.     Violeta Neff is a 67 y.o. female who presents today for evaluation of   Chief Complaint   Patient presents with    Left Foot - Initial Evaluation         Subjective   Medications:   Home Medications:  Current Outpatient Medications on File Prior to Visit   Medication Sig    Calcium Carbonate-Vitamin D (CALTRATE 600+D PO) Take 1,200 mg by mouth 2 (Two) Times a Day.    denosumab (Prolia) 60 MG/ML solution prefilled syringe syringe Inject 1 mL under the skin into the appropriate area as directed Every 6 (Six) Months.    ibuprofen (ADVIL,MOTRIN) 400 MG tablet Take 1 tablet by mouth Every 6 (Six) Hours As Needed for Mild Pain.    multivitamins-minerals (PRESERVISION AREDS 2) capsule capsule Take 1 capsule by mouth 2 (Two) Times a Day.     No current facility-administered medications on file prior to visit.     Current Medications:  Scheduled Meds:  Continuous Infusions:No current facility-administered medications for this visit.    PRN Meds:.    I have reviewed the patient's medical history in detail and updated the computerized patient record.  Review and summarization of old records includes:    Past Medical History:   Diagnosis Date    Osteoporosis         Past Surgical History:   Procedure Laterality Date     SECTION      CHOLECYSTECTOMY          Social History     Occupational History    Not on file   Tobacco Use    Smoking status: Every Day     Packs/day: 0.50     Types: Cigarettes    Smokeless tobacco: Never   Vaping Use    Vaping  "Use: Never used   Substance and Sexual Activity    Alcohol use: No    Drug use: No    Sexual activity: Defer      Social History     Social History Narrative    Not on file        Family History   Problem Relation Age of Onset    No Known Problems Mother     Cancer Father         leukemia    Breast cancer Neg Hx        ROS: 14 point review of systems was performed and all other systems were reviewed and are negative except for documented findings in HPI and today's encounter.     Allergies:   Allergies   Allergen Reactions    Naproxen Hives     Constitutional:  Denies fever, shaking or chills   Eyes:  Denies change in visual acuity   HENT:  Denies nasal congestion or sore throat   Respiratory:  Denies cough or shortness of breath   Cardiovascular:  Denies chest pain or severe LE edema   GI:  Denies abdominal pain, nausea, vomiting, bloody stools or diarrhea   Musculoskeletal:  Numbness, tingling, pain, or loss of motor function only as noted above in history of present illness.  : Denies painful urination or hematuria  Integument:  Denies rash, lesion or ulceration   Neurologic:  Denies headache or focal weakness  Endocrine:  Denies lymphadenopathy  Psych:  Denies confusion or change in mental status   Hem:  Denies active bleeding    OBJECTIVE:  Physical Exam: 67 y.o. female  Wt Readings from Last 3 Encounters:   06/05/23 47 kg (103 lb 9.6 oz)   01/17/23 45.3 kg (99 lb 14.4 oz)   01/12/23 47.6 kg (105 lb)     Ht Readings from Last 1 Encounters:   06/05/23 157.5 cm (62.01\")     Body mass index is 18.94 kg/m².  Vitals:    06/05/23 0845   Temp: 97.5 °F (36.4 °C)     Vital signs reviewed.     General Appearance:    Alert, cooperative, in no acute distress                  Eyes: conjunctiva clear  ENT: external ears and nose atraumatic  CV: no peripheral edema  Resp: normal respiratory effort  Skin: no rashes or wounds; normal turgor  Psych: mood and affect appropriate  Lymph: no nodes appreciated  Neuro: gross " "sensation intact  Vascular:  Palpable peripheral pulse in noted extremity  Musculoskeletal Extremities: Skin is warm dry intact left foot with good pulses movement sensation she is got point tenderness over the second metatarsal midshaft no obvious deformity or angulation cap refills intact calf is soft and nontender ankles nontender    Radiology:     3 views left foot done for pain questionable acute fracture of the midshaft second metatarsal or old fracture acute avulsion fracture    XAMINATION: 3-view left foot radiograph; 3-view left ankle radiograph   DATE: 5/27/2023 7:45   ACCESSION: [id number], [id number]   PROVIDED INDICATION: trauma   COMPARISON: None     TECHNIQUE: AP, lateral, and oblique radiographs of the left foot and left ankle were obtained.     FINDINGS: Dorsal navicular avulsion fracture. There is bony callus formation at the mid shaft of the second metatarsal consistent with remote healed fracture. Sclerotic line at the midshaft of the fifth metatarsal is compatible with a remote fracture. Swelling of the dorsal lateral foot soft tissues. No radiopaque foreign body.     IMPRESSION:     Small dorsal navicular avulsion fracture.     \"I, the attending/teaching physician, have personally reviewed, discussed, and supervised this radiological examination with the resident and this report reflects my agreement.\"     Dictated by: Blaine Hensley MD Signed by [place] M.D. on         Assessment:     ICD-10-CM ICD-9-CM   1. Pain  R52 780.96   2. Closed nondisplaced fracture of navicular bone of left foot, initial encounter  S92.255A 825.22        Procedures  Cam boot short and ofload with a cane     MDM/Plan:   The diagnosis(es), natural history, pathophysiology and treatment for diagnosis(es) were discussed. Opportunity given and questions answered.  Biomechanics of pertinent body areas discussed.  When appropriate, the use of ambulatory aids discussed.  EXERCISES:  Advice on benefits of, and " types of regular/moderate exercise pertaining to orthopedic diagnosis(es).  MEDICATIONS:  The risks, benefits, warnings,side effects and alternatives of medications discussed.  Inflammation/pain control; with cold, heat, elevation and/or liniments discussed as appropriate  MEDICAL RECORDS reviewed from other provider(s) for past and current medical history pertinent to this complaint.      6/7/2023    Much of this encounter note is an electronic transcription/translation of spoken language to printed text. The electronic translation of spoken language may permit erroneous, or at times, nonsensical words or phrases to be inadvertently transcribed; Although I have reviewed the note for such errors, some may still exist

## 2023-06-15 ENCOUNTER — TELEPHONE (OUTPATIENT)
Dept: FAMILY MEDICINE CLINIC | Facility: CLINIC | Age: 68
End: 2023-06-15
Payer: MEDICARE

## 2023-06-16 DIAGNOSIS — M81.0 AGE-RELATED OSTEOPOROSIS WITHOUT CURRENT PATHOLOGICAL FRACTURE: Primary | ICD-10-CM

## 2023-06-19 PROBLEM — S93.402A SPRAIN OF LEFT ANKLE: Status: ACTIVE | Noted: 2023-06-19

## 2023-06-19 PROBLEM — S86.309A PERONEAL TENDON INJURY: Status: ACTIVE | Noted: 2023-06-19

## 2023-06-19 PROBLEM — M76.822 POSTERIOR TIBIAL TENDONITIS, LEFT: Status: ACTIVE | Noted: 2023-06-19

## 2023-06-19 PROBLEM — S92.255A CLOSED NONDISPLACED FRACTURE OF NAVICULAR BONE OF LEFT FOOT: Status: ACTIVE | Noted: 2023-06-19

## 2023-07-19 PROBLEM — S86.319A TEAR OF PERONEAL TENDON: Status: ACTIVE | Noted: 2023-07-19

## 2023-07-19 PROBLEM — M76.72 PERONEAL TENDONITIS, LEFT: Status: ACTIVE | Noted: 2023-06-19

## 2023-07-19 PROBLEM — S92.025A CLOSED NONDISPLACED FRACTURE OF ANTERIOR PROCESS OF LEFT CALCANEUS: Status: ACTIVE | Noted: 2023-07-19

## 2023-08-16 ENCOUNTER — OFFICE VISIT (OUTPATIENT)
Dept: ORTHOPEDIC SURGERY | Facility: CLINIC | Age: 68
End: 2023-08-16
Payer: MEDICARE

## 2023-08-16 VITALS — HEIGHT: 62 IN | WEIGHT: 96 LBS | BODY MASS INDEX: 17.66 KG/M2 | TEMPERATURE: 96.6 F

## 2023-08-16 DIAGNOSIS — S86.312D TEAR OF PERONEAL TENDON, LEFT, SUBSEQUENT ENCOUNTER: ICD-10-CM

## 2023-08-16 DIAGNOSIS — R93.7 BONE MARROW EDEMA: ICD-10-CM

## 2023-08-16 DIAGNOSIS — S92.025K CLOSED NONDISPLACED FRACTURE OF ANTERIOR PROCESS OF LEFT CALCANEUS WITH NONUNION, SUBSEQUENT ENCOUNTER: Primary | ICD-10-CM

## 2023-08-16 DIAGNOSIS — M76.72 PERONEAL TENDONITIS, LEFT: ICD-10-CM

## 2023-08-16 DIAGNOSIS — R52 PAIN: ICD-10-CM

## 2023-08-16 NOTE — PROGRESS NOTES
"Foot/Ankle Follow Up      Patient: Violeta Neff    YOB: 1955 67 y.o. female    Chief Complaints: Ankle \"feels a whole lot better\" but still gets sore    History of Present Illness:Patient sustained injury to her left ankle around 5/26/2023.  Her daughter has been moving and she has stopped to turn off a light for her and fell coming off her steps inverting her ankle.  She felt and heard a pop and had swelling.     She was seen at the [facility] facility on 5/27/2023 and subsequently saw Agatha on 6/5/2023 at which time she was fitted with a boot and instructed to continue with use of a walking stick.     I saw her on 6/19/2023 at which time she reported that she was about 50% better but still had pain over the dorsum of the hindfoot as well as anterolateral ankle posterolateral ankle and some medially.  She had not been using her walking stick.     I had her previously for a left second metatarsal stress fracture in 2018.  She did not have pain in the forefoot.  Her last vitamin D had been on 1/12/2023 and was normal at 57.5 done by her PCP.     We discussed treatment going forward and I feel she had an further ligamentous injury with possible peroneal tendon injury as well as dorsal navicular avulsion fracture and possible posterior tib and/or deltoid injury.  I was encouraged that she was better.  She was transition to a tall boot and was not wearing the short boot properly anyway.  I encouraged use of a walking stick.  Refilled her tramadol and was sent for MRI of the left ankle to evaluate for peroneal tendon injury.     Patient was seen on 7/19/2023 stating that she did feel significantly better.  She did however still get some pain in her ankle laterally more so than medially and had had new onset of some pain in her forefoot around her third metatarsal/toe more recently.  She was only intermittently using a cane but was using her boot.  She continues to work standing as a .    We " "discussed treatment going forward and did not recommend surgical treatment at that time nor did she desire.  Did not see any obvious clear sign of fracture in the forefoot I did not feel he needed further work-up.  She was instructed on transitioning out of her boot to an ASO brace initially around the house over the next week to 10 days and avoid barefoot ambulation using a sturdy shoe and offload with a cane.  If doing well she instructed she is targeting a 6 out of the house.  Try to hold off on therapy and let me know if anything worsens her forefoot pain    She is seen back today stating that she is continue to feel a lot better but still has intermittent pain and still using her boot at work.  She has not been using the ASO brace when not at work as she could not get into her shoe.  Her forefoot pain has improved but feels that she has difficulty spreading her toes.  Ankle pain is rated 4 out of 10  HPI    ROS: ankle pain  Past Medical History:   Diagnosis Date    Osteoporosis        Physical Exam:   Vitals:    08/16/23 0838   Temp: 96.6 øF (35.9 øC)   Weight: 43.5 kg (96 lb)   Height: 157.5 cm (62\")   PainSc:   4     Well developed with normal mood.  On exam she has moderate discomfort to palpation over the anterolateral aspect of the dorsal lateral hindfoot along the anterior process the calcaneus as well as some discomfort on the peroneal tendons and minimal discomfort anterolaterally along the capsule ligamentous complex.  I was unable to elicit focal discomfort over the dorsum of the forefoot but she did have some difficulty spreading her toes.      Radiology: 3 views of the left ankle including lateral view of the foot and 2 views left foot ordered evaluate pain and alignment reviewed and compared to previous x-rays.  There remains a healed second metatarsal fracture.  I do not see any evidence of stress fracture of the third metatarsal or any change to the fifth.  Talus remains well-seated within the " mortise and difficult to say for sure but does appear to be possibly nondisplaced anterior process calcaneus fracture.    MRI films and report of the left ankle dated 6/22/2023 from [facility] reviewed which showed minimal fluid in the posterior recess of the tibiotalar and subtalar joints.  There was marrow edema at the head and neck of the talus predominantly medially and extending to the middle facet of the anterior aspect of the posterior facet head with bone contusion but no discrete fracture line.  There is a dorsal talonavicular capsule ligamentous thickening and underlying dorsal marrow edema of the talus and navicular and dorsal articular subacute corner fracture versus incidental os supra naviculare with superimposed bone contusion     There was marrow edema at the subarticular superior calcaneocuboid joint compatible with bone contusion and adjacent edema at the extensor digitorum brevis muscle extending to the calcaneal origin.  Radiologist did not feel there was evidence of anterior superior process calcaneus fracture but to my review there appeared to be possibly as such     Syndesmosis was intact as were the ATFL CFL and medial deltoid.     There is peroneus brevis tendinosis and longitudinal split tear extending from the retromalleolar into the proximal inframalleolar segment over a length of 1 to 1.5 cm.  There is peroneus longus tendinosis with short segment retromalleolar and proximal inframalleolar interstitial tear      Assessment/Plan:  1.  Left ankle peroneus brevis tendinosis and longitudinal split tear with peroneus longus tendinosis and possible interstitial tear  2.  Left ankle marrow edema head and neck of the talus extending into the middle facet and anterior aspect of the posterior facet compatible with bone contusion  3.  Left dorsal talonavicular capsule ligamentous thickening with underlying marrow edema and dorsal articular subacute corner fracture of the navicular  3.  Marrow edema  superior calcaneocuboid joint compatible with bone contusion and edema of the EDB muscle with possible anterior process calcaneus fracture  4.  Left forefoot metatarsalgia without clear evidence of stress fracture.    We discussed treatment going forward and she may be having a bit of difficulty spreading her toes from the EDB pathology but nothing I recommend from a surgical standpoint for that.    However I am concerned about her persistent peroneal tendon pain as well as anterolateral hindfoot pain with possible anterior process calcaneus fracture    We had her fitted with an air stirrup brace to use instead of her boot as she could not get the ASO into her shoe and may use that with or without a lateral heel wedge and if not using the air stirrup brace to use lose the lateral heel wedge    She will continue with her boot at work    She was instructed on intrinsic stretching and strengthening exercises for the toes    We will get a CT scan of her left hindfoot evaluate the anterior process calcaneus fracture and see her back in 3 to 4 weeks with x-rays of her left foot.

## 2023-09-12 ENCOUNTER — HOSPITAL ENCOUNTER (OUTPATIENT)
Dept: BONE DENSITY | Facility: HOSPITAL | Age: 68
Discharge: HOME | End: 2023-09-12
Admitting: FAMILY MEDICINE
Payer: MEDICARE

## 2023-09-12 DIAGNOSIS — M81.0 AGE-RELATED OSTEOPOROSIS WITHOUT CURRENT PATHOLOGICAL FRACTURE: ICD-10-CM

## 2023-09-12 PROCEDURE — 77080 DXA BONE DENSITY AXIAL: CPT

## 2024-01-15 ENCOUNTER — HOSPITAL ENCOUNTER (OUTPATIENT)
Dept: INFUSION THERAPY | Facility: HOSPITAL | Age: 69
Discharge: HOME | End: 2024-01-15
Admitting: FAMILY MEDICINE
Payer: MEDICARE

## 2024-01-15 VITALS
SYSTOLIC BLOOD PRESSURE: 119 MMHG | TEMPERATURE: 96.7 F | RESPIRATION RATE: 18 BRPM | DIASTOLIC BLOOD PRESSURE: 79 MMHG | HEART RATE: 58 BPM

## 2024-01-15 DIAGNOSIS — E55.9 VITAMIN D DEFICIENCY: ICD-10-CM

## 2024-01-15 DIAGNOSIS — M76.822 POSTERIOR TIBIAL TENDONITIS, LEFT: ICD-10-CM

## 2024-01-15 DIAGNOSIS — S93.402A SPRAIN OF LEFT ANKLE, UNSPECIFIED LIGAMENT, INITIAL ENCOUNTER: ICD-10-CM

## 2024-01-15 DIAGNOSIS — F17.210 CIGARETTE NICOTINE DEPENDENCE WITHOUT COMPLICATION: ICD-10-CM

## 2024-01-15 DIAGNOSIS — Z12.11 COLON CANCER SCREENING: ICD-10-CM

## 2024-01-15 DIAGNOSIS — M81.0 AGE-RELATED OSTEOPOROSIS WITHOUT CURRENT PATHOLOGICAL FRACTURE: Primary | ICD-10-CM

## 2024-01-15 DIAGNOSIS — S86.309A PERONEAL TENDON INJURY, UNSPECIFIED LATERALITY, INITIAL ENCOUNTER: ICD-10-CM

## 2024-01-15 DIAGNOSIS — E78.2 MIXED HYPERLIPIDEMIA: ICD-10-CM

## 2024-01-15 DIAGNOSIS — S92.255A CLOSED NONDISPLACED FRACTURE OF NAVICULAR BONE OF LEFT FOOT, INITIAL ENCOUNTER: ICD-10-CM

## 2024-01-15 LAB
25(OH)D3 SERPL-MCNC: 55.9 NG/ML (ref 30–100)
ALBUMIN SERPL-MCNC: 4.1 G/DL (ref 3.5–5.2)
ALBUMIN/GLOB SERPL: 1.8 G/DL
ALP SERPL-CCNC: 56 U/L (ref 39–117)
ALT SERPL W P-5'-P-CCNC: 14 U/L (ref 1–33)
ANION GAP SERPL CALCULATED.3IONS-SCNC: 6.8 MMOL/L (ref 5–15)
AST SERPL-CCNC: 20 U/L (ref 1–32)
BASOPHILS # BLD AUTO: 0.06 10*3/MM3 (ref 0–0.2)
BASOPHILS NFR BLD AUTO: 0.8 % (ref 0–1.5)
BILIRUB SERPL-MCNC: 0.3 MG/DL (ref 0–1.2)
BUN SERPL-MCNC: 20 MG/DL (ref 8–23)
BUN/CREAT SERPL: 22.2 (ref 7–25)
CALCIUM SPEC-SCNC: 9.4 MG/DL (ref 8.6–10.5)
CHLORIDE SERPL-SCNC: 104 MMOL/L (ref 98–107)
CHOLEST SERPL-MCNC: 247 MG/DL (ref 0–200)
CO2 SERPL-SCNC: 27.2 MMOL/L (ref 22–29)
CREAT SERPL-MCNC: 0.9 MG/DL (ref 0.57–1)
DEPRECATED RDW RBC AUTO: 43.6 FL (ref 37–54)
EGFRCR SERPLBLD CKD-EPI 2021: 69.3 ML/MIN/1.73
EOSINOPHIL # BLD AUTO: 0.74 10*3/MM3 (ref 0–0.4)
EOSINOPHIL NFR BLD AUTO: 9.9 % (ref 0.3–6.2)
ERYTHROCYTE [DISTWIDTH] IN BLOOD BY AUTOMATED COUNT: 12.4 % (ref 12.3–15.4)
GLOBULIN UR ELPH-MCNC: 2.3 GM/DL
GLUCOSE SERPL-MCNC: 92 MG/DL (ref 65–99)
HCT VFR BLD AUTO: 46.8 % (ref 34–46.6)
HDLC SERPL-MCNC: 77 MG/DL (ref 40–60)
HGB BLD-MCNC: 14.9 G/DL (ref 12–15.9)
IMM GRANULOCYTES # BLD AUTO: 0.01 10*3/MM3 (ref 0–0.05)
IMM GRANULOCYTES NFR BLD AUTO: 0.1 % (ref 0–0.5)
LDLC SERPL CALC-MCNC: 155 MG/DL (ref 0–100)
LDLC/HDLC SERPL: 1.97 {RATIO}
LYMPHOCYTES # BLD AUTO: 2.68 10*3/MM3 (ref 0.7–3.1)
LYMPHOCYTES NFR BLD AUTO: 36 % (ref 19.6–45.3)
MAGNESIUM SERPL-MCNC: 2.3 MG/DL (ref 1.6–2.4)
MCH RBC QN AUTO: 29.9 PG (ref 26.6–33)
MCHC RBC AUTO-ENTMCNC: 31.8 G/DL (ref 31.5–35.7)
MCV RBC AUTO: 93.8 FL (ref 79–97)
MONOCYTES # BLD AUTO: 0.66 10*3/MM3 (ref 0.1–0.9)
MONOCYTES NFR BLD AUTO: 8.9 % (ref 5–12)
NEUTROPHILS NFR BLD AUTO: 3.29 10*3/MM3 (ref 1.7–7)
NEUTROPHILS NFR BLD AUTO: 44.3 % (ref 42.7–76)
PHOSPHATE SERPL-MCNC: 3.4 MG/DL (ref 2.5–4.5)
PLATELET # BLD AUTO: 246 10*3/MM3 (ref 140–450)
PMV BLD AUTO: 10.1 FL (ref 6–12)
POTASSIUM SERPL-SCNC: 3.8 MMOL/L (ref 3.5–5.2)
PROT SERPL-MCNC: 6.4 G/DL (ref 6–8.5)
RBC # BLD AUTO: 4.99 10*6/MM3 (ref 3.77–5.28)
SODIUM SERPL-SCNC: 138 MMOL/L (ref 136–145)
TRIGL SERPL-MCNC: 90 MG/DL (ref 0–150)
TSH SERPL DL<=0.05 MIU/L-ACNC: 2.9 UIU/ML (ref 0.27–4.2)
VLDLC SERPL-MCNC: 15 MG/DL (ref 5–40)
WBC NRBC COR # BLD AUTO: 7.44 10*3/MM3 (ref 3.4–10.8)

## 2024-01-15 PROCEDURE — 80053 COMPREHEN METABOLIC PANEL: CPT | Performed by: FAMILY MEDICINE

## 2024-01-15 PROCEDURE — 25010000002 DENOSUMAB 60 MG/ML SOLUTION PREFILLED SYRINGE: Performed by: FAMILY MEDICINE

## 2024-01-15 PROCEDURE — 83735 ASSAY OF MAGNESIUM: CPT | Performed by: FAMILY MEDICINE

## 2024-01-15 PROCEDURE — 85025 COMPLETE CBC W/AUTO DIFF WBC: CPT | Performed by: FAMILY MEDICINE

## 2024-01-15 PROCEDURE — 80061 LIPID PANEL: CPT | Performed by: FAMILY MEDICINE

## 2024-01-15 PROCEDURE — 84100 ASSAY OF PHOSPHORUS: CPT | Performed by: FAMILY MEDICINE

## 2024-01-15 PROCEDURE — 82306 VITAMIN D 25 HYDROXY: CPT | Performed by: FAMILY MEDICINE

## 2024-01-15 PROCEDURE — 36415 COLL VENOUS BLD VENIPUNCTURE: CPT

## 2024-01-15 PROCEDURE — 96372 THER/PROPH/DIAG INJ SC/IM: CPT

## 2024-01-15 PROCEDURE — 84443 ASSAY THYROID STIM HORMONE: CPT | Performed by: FAMILY MEDICINE

## 2024-01-15 RX ADMIN — DENOSUMAB 60 MG: 60 INJECTION SUBCUTANEOUS at 09:09

## 2024-01-15 NOTE — PATIENT INSTRUCTIONS
"  Call [facility] at [phone] if you have any problems or concerns.    We know you have a Choice in healthcare and appreciate you using [facility].  Our purpose is to provide you \"Excellent Care\".  We hope that you will always choose us in the future and continue to recommend us to your family and friends.              "

## 2024-01-15 NOTE — NURSING NOTE
PT ARRIVED TO [facility] AT 8:30 AM FOR APPT. VSS, NO COMPLAINTS AT THIS TIME. LABS DRAWN VIA VENIPUNCTURE. MEDICATION GIVEN PER MD ORDER. PT TOLERATED WELL. AVS PRINTED OUT, COPY GIVEN TO PT. PT DISCHARGED FROM [facility] AT 9:25 AM IN STABLE CONDITION, WITHOUT COMPLAINTS.

## 2024-01-17 ENCOUNTER — TELEPHONE (OUTPATIENT)
Dept: FAMILY MEDICINE CLINIC | Facility: CLINIC | Age: 69
End: 2024-01-17
Payer: MEDICARE

## 2024-01-18 DIAGNOSIS — M81.0 AGE-RELATED OSTEOPOROSIS WITHOUT CURRENT PATHOLOGICAL FRACTURE: Primary | ICD-10-CM

## 2024-01-22 ENCOUNTER — OFFICE VISIT (OUTPATIENT)
Dept: FAMILY MEDICINE CLINIC | Facility: CLINIC | Age: 69
End: 2024-01-22
Payer: MEDICARE

## 2024-01-22 VITALS
HEIGHT: 62 IN | OXYGEN SATURATION: 97 % | BODY MASS INDEX: 18.58 KG/M2 | SYSTOLIC BLOOD PRESSURE: 110 MMHG | TEMPERATURE: 97.3 F | WEIGHT: 101 LBS | HEART RATE: 65 BPM | DIASTOLIC BLOOD PRESSURE: 60 MMHG

## 2024-01-22 DIAGNOSIS — F17.210 CIGARETTE NICOTINE DEPENDENCE WITHOUT COMPLICATION: ICD-10-CM

## 2024-01-22 DIAGNOSIS — E55.9 VITAMIN D DEFICIENCY: ICD-10-CM

## 2024-01-22 DIAGNOSIS — Z00.00 MEDICARE ANNUAL WELLNESS VISIT, SUBSEQUENT: Primary | ICD-10-CM

## 2024-01-22 DIAGNOSIS — Z12.31 ENCOUNTER FOR SCREENING MAMMOGRAM FOR MALIGNANT NEOPLASM OF BREAST: ICD-10-CM

## 2024-01-22 DIAGNOSIS — M81.0 AGE-RELATED OSTEOPOROSIS WITHOUT CURRENT PATHOLOGICAL FRACTURE: ICD-10-CM

## 2024-01-22 DIAGNOSIS — E78.2 MIXED HYPERLIPIDEMIA: ICD-10-CM

## 2024-01-22 PROCEDURE — 1170F FXNL STATUS ASSESSED: CPT | Performed by: FAMILY MEDICINE

## 2024-01-22 PROCEDURE — 99213 OFFICE O/P EST LOW 20 MIN: CPT | Performed by: FAMILY MEDICINE

## 2024-01-22 PROCEDURE — 1160F RVW MEDS BY RX/DR IN RCRD: CPT | Performed by: FAMILY MEDICINE

## 2024-01-22 PROCEDURE — 1159F MED LIST DOCD IN RCRD: CPT | Performed by: FAMILY MEDICINE

## 2024-01-22 PROCEDURE — G0439 PPPS, SUBSEQ VISIT: HCPCS | Performed by: FAMILY MEDICINE

## 2024-01-22 NOTE — PROGRESS NOTES
The ABCs of the Annual Wellness Visit  Subsequent Medicare Wellness Visit    Subjective    Violeta Neff is a 68 y.o. female who presents for a Subsequent Medicare Wellness Visit.    The following portions of the patient's history were reviewed and   updated as appropriate: allergies, current medications, past family history, past medical history, past social history, past surgical history, and problem list.    Compared to one year ago, the patient feels her physical   health is the same.    Compared to one year ago, the patient feels her mental   health is the same.    Recent Hospitalizations:  She was not admitted to the hospital during the last year.       Current Medical Providers:  Patient Care Team:  Baudilio Nicole,  as PCP - General (Family Medicine)    Outpatient Medications Prior to Visit   Medication Sig Dispense Refill    Calcium Carbonate-Vitamin D (CALTRATE 600+D PO) Take 1,200 mg by mouth 2 (Two) Times a Day.      denosumab (Prolia) 60 MG/ML solution prefilled syringe syringe Inject 1 mL under the skin into the appropriate area as directed Every 6 (Six) Months.      ibuprofen (ADVIL,MOTRIN) 400 MG tablet Take 1 tablet by mouth Every 6 (Six) Hours As Needed for Mild Pain.      multivitamins-minerals (PRESERVISION AREDS 2) capsule capsule Take 1 capsule by mouth 2 (Two) Times a Day.      traMADol (ULTRAM) 50 MG tablet 1 Oral Q12H PRN severe pain 40 tablet 0     No facility-administered medications prior to visit.       No opioid medication identified on active medication list. I have reviewed chart for other potential  high risk medication/s and harmful drug interactions in the elderly.        Aspirin is not on active medication list.  Aspirin use is not indicated based on review of current medical condition/s. Risk of harm outweighs potential benefits.  .    Patient Active Problem List   Diagnosis    Age-related osteoporosis without current pathological fracture    Mixed hyperlipidemia    Closed  "displaced fracture of second metatarsal bone of left foot    Leukocytosis    Cigarette nicotine dependence without complication    Vitamin D deficiency    Peroneal tendonitis, left    Sprain of left ankle    Closed nondisplaced fracture of navicular bone of left foot    Posterior tibial tendonitis, left    Tear of peroneal tendon    Closed nondisplaced fracture of anterior process of left calcaneus     Advance Care Planning   Advance Care Planning     Advance Directive is not on file.  ACP discussion was held with the patient during this visit. Patient does not have an advance directive, information provided.     Objective    Vitals:    24 0843   BP: 110/60   Pulse: 65   Temp: 97.3 °F (36.3 °C)   SpO2: 97%   Weight: 45.8 kg (101 lb)   Height: 157.5 cm (62\")   PainSc: 0-No pain     Estimated body mass index is 18.47 kg/m² as calculated from the following:    Height as of this encounter: 157.5 cm (62\").    Weight as of this encounter: 45.8 kg (101 lb).    BMI is below normal parameters (malnutrition). Recommendations: Patient now has a BMI of 18.5 which is considered the lower limit of normal.      Does the patient have evidence of cognitive impairment? No    Lab Results   Component Value Date    TRIG 90 01/15/2024    HDL 77 (H) 01/15/2024     (H) 01/15/2024    VLDL 15 01/15/2024        HEALTH RISK ASSESSMENT    Smoking Status:  Social History     Tobacco Use   Smoking Status Every Day    Packs/day: .5    Types: Cigarettes   Smokeless Tobacco Never     Alcohol Consumption:  Social History     Substance and Sexual Activity   Alcohol Use No     Fall Risk Screen:    STEADI Fall Risk Assessment was completed, and patient is at LOW risk for falls.Assessment completed on:2024    Depression Screenin/22/2024     8:50 AM   PHQ-2/PHQ-9 Depression Screening   Little Interest or Pleasure in Doing Things 0-->not at all   Feeling Down, Depressed or Hopeless 0-->not at all   PHQ-9: Brief Depression " Severity Measure Score 0       Health Habits and Functional and Cognitive Screenin/22/2024     8:52 AM   Functional & Cognitive Status   Do you have difficulty preparing food and eating? No   Do you have difficulty bathing yourself, getting dressed or grooming yourself? No   Do you have difficulty using the toilet? No   Do you have difficulty moving around from place to place? No   Do you have trouble with steps or getting out of a bed or a chair? No   Current Diet Well Balanced Diet   Dental Exam Up to date   Eye Exam Up to date   Exercise (times per week) 7 times per week   Current Exercises Include Walking   Do you need help using the phone?  No   Are you deaf or do you have serious difficulty hearing?  No   Do you need help to go to places out of walking distance? No   Do you need help shopping? No   Do you need help preparing meals?  No   Do you need help with housework?  No   Do you need help with laundry? No   Do you need help taking your medications? No   Do you need help managing money? No   Do you ever drive or ride in a car without wearing a seat belt? No   Have you felt unusual stress, anger or loneliness in the last month? No   Who do you live with? Spouse   If you need help, do you have trouble finding someone available to you? No   Have you been bothered in the last four weeks by sexual problems? No   Do you have difficulty concentrating, remembering or making decisions? No       Age-appropriate Screening Schedule:  Refer to the list below for future screening recommendations based on patient's age, sex and/or medical conditions. Orders for these recommended tests are listed in the plan section. The patient has been provided with a written plan.    Health Maintenance   Topic Date Due    Pneumococcal Vaccine 65+ (1 of 2 - PCV) Never done    TDAP/TD VACCINES (1 - Tdap) Never done    HEPATITIS C SCREENING  Never done    ZOSTER VACCINE (2 of 3) 2017    PAP SMEAR  2020    COLORECTAL  CANCER SCREENING  07/25/2023    COVID-19 Vaccine (4 - 2023-24 season) 09/01/2023    INFLUENZA VACCINE  03/31/2024 (Originally 8/1/2023)    LIPID PANEL  01/15/2025    MAMMOGRAM  01/17/2025    ANNUAL WELLNESS VISIT  01/22/2025    BMI FOLLOWUP  01/22/2025    DXA SCAN  09/12/2025                  CMS Preventative Services Quick Reference  Risk Factors Identified During Encounter  Fall Risk-High or Moderate: Discussed Fall Prevention in the home  Immunizations Discussed/Encouraged: Tdap, Influenza, Prevnar 20 (Pneumococcal 20-valent conjugate), and Shingrix  Inactivity/Sedentary: Patient was advised to exercise at least 150 minutes a week per CDC recommendations.  Dental Screening Recommended  Vision Screening Recommended  The above risks/problems have been discussed with the patient.  Pertinent information has been shared with the patient in the After Visit Summary.  An After Visit Summary and PPPS were made available to the patient.    Follow Up:   Next Medicare Wellness visit to be scheduled in 1 year.       Additional E&M Note during same encounter follows:  Patient has multiple medical problems which are significant and separately identifiable that require additional work above and beyond the Medicare Wellness Visit.      Chief Complaint  Medicare Wellness-subsequent    Subjective        HPI  Violeta Neff is also being seen today for 68-year-old white female here for subsequent Medicare wellness visit as well as for further medical management of several medical issues.  Patient has age-related osteoporosis and will need therapy order placed to have her next Prolia shot.  Last DEXA scan was performed in September of last year.  Prolia has actually been benefiting.  She has history of hyperlipidemia, vitamin D deficiency, as well as the above-mentioned osteoporosis.  She states that she continues to smoke at least half a pack per day but has been smoking for many years.  She states colonoscopy is up-to-date and  "not due until next year.  This has not been found in her chart currently.  She otherwise is doing well and is without acute complaint.    Review of Systems   Constitutional:         Nicotine addiction-cigarettes   Cardiovascular:         Hyperlipidemia   Endocrine:        Borderline underweight, vitamin D deficiency   Musculoskeletal:         Osteoporosis   All other systems reviewed and are negative.      Objective   Vital Signs:  /60   Pulse 65   Temp 97.3 °F (36.3 °C)   Ht 157.5 cm (62\")   Wt 45.8 kg (101 lb)   SpO2 97%   BMI 18.47 kg/m²     Physical Exam  Vitals and nursing note reviewed.   Constitutional:       Appearance: Normal appearance. She is well-developed and well-groomed.   HENT:      Head: Normocephalic and atraumatic.   Neck:      Thyroid: No thyroid mass or thyromegaly.      Vascular: Normal carotid pulses. No carotid bruit.      Trachea: Trachea and phonation normal.   Cardiovascular:      Rate and Rhythm: Normal rate and regular rhythm.      Heart sounds: Normal heart sounds. No murmur heard.     No friction rub. No gallop.   Pulmonary:      Effort: Pulmonary effort is normal. No respiratory distress.      Breath sounds: Normal breath sounds. No decreased breath sounds, wheezing, rhonchi or rales.   Musculoskeletal:      Cervical back: Neck supple.   Lymphadenopathy:      Cervical: No cervical adenopathy.   Skin:     General: Skin is warm and dry.      Findings: No rash.   Neurological:      Mental Status: She is alert and oriented to person, place, and time.   Psychiatric:         Attention and Perception: Attention and perception normal.         Mood and Affect: Mood and affect normal.         Speech: Speech normal.         Behavior: Behavior normal. Behavior is cooperative.         Thought Content: Thought content normal.         Cognition and Memory: Cognition and memory normal.         Judgment: Judgment normal.        Hospital Outpatient Visit on 01/15/2024   Component Date Value " Ref Range Status    Glucose 01/15/2024 92  65 - 99 mg/dL Final    BUN 01/15/2024 20  8 - 23 mg/dL Final    Creatinine 01/15/2024 0.90  0.57 - 1.00 mg/dL Final    Sodium 01/15/2024 138  136 - 145 mmol/L Final    Potassium 01/15/2024 3.8  3.5 - 5.2 mmol/L Final    Chloride 01/15/2024 104  98 - 107 mmol/L Final    CO2 01/15/2024 27.2  22.0 - 29.0 mmol/L Final    Calcium 01/15/2024 9.4  8.6 - 10.5 mg/dL Final    Total Protein 01/15/2024 6.4  6.0 - 8.5 g/dL Final    Albumin 01/15/2024 4.1  3.5 - 5.2 g/dL Final    ALT (SGPT) 01/15/2024 14  1 - 33 U/L Final    AST (SGOT) 01/15/2024 20  1 - 32 U/L Final    Alkaline Phosphatase 01/15/2024 56  39 - 117 U/L Final    Total Bilirubin 01/15/2024 0.3  0.0 - 1.2 mg/dL Final    Globulin 01/15/2024 2.3  gm/dL Final    A/G Ratio 01/15/2024 1.8  g/dL Final    BUN/Creatinine Ratio 01/15/2024 22.2  7.0 - 25.0 Final    Anion Gap 01/15/2024 6.8  5.0 - 15.0 mmol/L Final    eGFR 01/15/2024 69.3  >60.0 mL/min/1.73 Final    Magnesium 01/15/2024 2.3  1.6 - 2.4 mg/dL Final    Phosphorus 01/15/2024 3.4  2.5 - 4.5 mg/dL Final    25 Hydroxy, Vitamin D 01/15/2024 55.9  30.0 - 100.0 ng/ml Final    WBC 01/15/2024 7.44  3.40 - 10.80 10*3/mm3 Final    RBC 01/15/2024 4.99  3.77 - 5.28 10*6/mm3 Final    Hemoglobin 01/15/2024 14.9  12.0 - 15.9 g/dL Final    Hematocrit 01/15/2024 46.8 (H)  34.0 - 46.6 % Final    MCV 01/15/2024 93.8  79.0 - 97.0 fL Final    MCH 01/15/2024 29.9  26.6 - 33.0 pg Final    MCHC 01/15/2024 31.8  31.5 - 35.7 g/dL Final    RDW 01/15/2024 12.4  12.3 - 15.4 % Final    RDW-SD 01/15/2024 43.6  37.0 - 54.0 fl Final    MPV 01/15/2024 10.1  6.0 - 12.0 fL Final    Platelets 01/15/2024 246  140 - 450 10*3/mm3 Final    Neutrophil % 01/15/2024 44.3  42.7 - 76.0 % Final    Lymphocyte % 01/15/2024 36.0  19.6 - 45.3 % Final    Monocyte % 01/15/2024 8.9  5.0 - 12.0 % Final    Eosinophil % 01/15/2024 9.9 (H)  0.3 - 6.2 % Final    Basophil % 01/15/2024 0.8  0.0 - 1.5 % Final    Immature Grans %  01/15/2024 0.1  0.0 - 0.5 % Final    Neutrophils, Absolute 01/15/2024 3.29  1.70 - 7.00 10*3/mm3 Final    Lymphocytes, Absolute 01/15/2024 2.68  0.70 - 3.10 10*3/mm3 Final    Monocytes, Absolute 01/15/2024 0.66  0.10 - 0.90 10*3/mm3 Final    Eosinophils, Absolute 01/15/2024 0.74 (H)  0.00 - 0.40 10*3/mm3 Final    Basophils, Absolute 01/15/2024 0.06  0.00 - 0.20 10*3/mm3 Final    Immature Grans, Absolute 01/15/2024 0.01  0.00 - 0.05 10*3/mm3 Final    TSH 01/15/2024 2.900  0.270 - 4.200 uIU/mL Final    Total Cholesterol 01/15/2024 247 (H)  0 - 200 mg/dL Final    Triglycerides 01/15/2024 90  0 - 150 mg/dL Final    HDL Cholesterol 01/15/2024 77 (H)  40 - 60 mg/dL Final    LDL Cholesterol  01/15/2024 155 (H)  0 - 100 mg/dL Final    VLDL Cholesterol 01/15/2024 15  5 - 40 mg/dL Final    LDL/HDL Ratio 01/15/2024 1.97   Final         The following data was reviewed by: Baudilio Nicole DO on 01/22/2024:  Common labs          7/13/2023    08:49 1/15/2024    08:30 1/15/2024    08:31   Common Labs   Glucose 96   92    BUN 25   20    Creatinine 0.94   0.90    Sodium 140   138    Potassium 4.2   3.8    Chloride 107   104    Calcium 8.6   9.4    Albumin 4.1   4.1    Total Bilirubin 0.4   0.3    Alkaline Phosphatase 47   56    AST (SGOT) 19   20    ALT (SGPT) 11   14    WBC 7.92  7.44     Hemoglobin 14.2  14.9     Hematocrit 43.5  46.8     Platelets 228  246     Total Cholesterol 177  247     Triglycerides 65  90     HDL Cholesterol 57  77     LDL Cholesterol  108  155       DEXA scan           Assessment and Plan   Diagnoses and all orders for this visit:    1. Medicare annual wellness visit, subsequent (Primary)  -     Comprehensive metabolic panel; Future  -     Phosphorus; Future  -     Magnesium; Future  -     TSH; Future  -     Vitamin D 25 hydroxy; Future  -     Lipid panel; Future  -     CBC w AUTO Differential; Future    2. Mixed hyperlipidemia  -     Comprehensive metabolic panel; Future  -     Phosphorus; Future  -      Magnesium; Future  -     TSH; Future  -     Vitamin D 25 hydroxy; Future  -     Lipid panel; Future  -     CBC w AUTO Differential; Future    3. Vitamin D deficiency  -     Comprehensive metabolic panel; Future  -     Phosphorus; Future  -     Magnesium; Future  -     TSH; Future  -     Vitamin D 25 hydroxy; Future  -     Lipid panel; Future  -     CBC w AUTO Differential; Future    4. Age-related osteoporosis without current pathological fracture  -     Comprehensive metabolic panel; Future  -     Phosphorus; Future  -     Magnesium; Future  -     TSH; Future  -     Vitamin D 25 hydroxy; Future  -     Lipid panel; Future  -     CBC w AUTO Differential; Future    5. Cigarette nicotine dependence without complication  -     Comprehensive metabolic panel; Future  -     Phosphorus; Future  -     Magnesium; Future  -     TSH; Future  -     Vitamin D 25 hydroxy; Future  -     Lipid panel; Future  -     CBC w AUTO Differential; Future    6. Encounter for screening mammogram for malignant neoplasm of breast  -     Mammo Screening Bilateral With CAD; Future  -     Comprehensive metabolic panel; Future  -     Phosphorus; Future  -     Magnesium; Future  -     TSH; Future  -     Vitamin D 25 hydroxy; Future  -     Lipid panel; Future  -     CBC w AUTO Differential; Future    Therapy plan was just placed for her next Prolia shot.  Strong recommendation to reduce cholesterol in her diet with anticipation of reevaluation of fasting labs and follow-up in 6 months.  Consider low-dose CT scan of the chest given her smoking history.  We will discuss this further in 6 months upon follow-up.  Discontinue smoking-patient adamantly not ready to do so.       I spent 30 minutes caring for Violeta on this date of service. This time includes time spent by me in the following activities:preparing for the visit, reviewing tests, obtaining and/or reviewing a separately obtained history, performing a medically appropriate examination and/or  evaluation , counseling and educating the patient/family/caregiver, ordering medications, tests, or procedures, referring and communicating with other health care professionals , documenting information in the medical record, independently interpreting results and communicating that information with the patient/family/caregiver, and care coordination  Follow Up   Return in about 6 months (around 7/22/2024) for Recheck.  Patient was given instructions and counseling regarding her condition or for health maintenance advice. Please see specific information pulled into the AVS if appropriate.

## 2024-03-22 ENCOUNTER — HOSPITAL ENCOUNTER (OUTPATIENT)
Dept: MAMMOGRAPHY | Facility: HOSPITAL | Age: 69
Discharge: HOME | End: 2024-03-22
Admitting: FAMILY MEDICINE
Payer: MEDICARE

## 2024-03-22 DIAGNOSIS — Z12.31 ENCOUNTER FOR SCREENING MAMMOGRAM FOR MALIGNANT NEOPLASM OF BREAST: ICD-10-CM

## 2024-03-22 PROCEDURE — 77067 SCR MAMMO BI INCL CAD: CPT

## 2024-03-22 PROCEDURE — 77063 BREAST TOMOSYNTHESIS BI: CPT

## 2024-03-26 DIAGNOSIS — N64.89 BREAST ASYMMETRY: Primary | ICD-10-CM

## 2024-05-23 ENCOUNTER — HOSPITAL ENCOUNTER (OUTPATIENT)
Dept: MAMMOGRAPHY | Facility: HOSPITAL | Age: 69
Discharge: HOME | End: 2024-05-23
Payer: MEDICARE

## 2024-05-23 ENCOUNTER — HOSPITAL ENCOUNTER (OUTPATIENT)
Dept: ULTRASOUND IMAGING | Facility: HOSPITAL | Age: 69
Discharge: HOME | End: 2024-05-23
Payer: MEDICARE

## 2024-05-23 DIAGNOSIS — N64.89 BREAST ASYMMETRY: ICD-10-CM

## 2024-05-23 PROCEDURE — 77065 DX MAMMO INCL CAD UNI: CPT | Performed by: RADIOLOGY

## 2024-05-23 PROCEDURE — G0279 TOMOSYNTHESIS, MAMMO: HCPCS | Performed by: RADIOLOGY

## 2024-05-23 PROCEDURE — 77065 DX MAMMO INCL CAD UNI: CPT

## 2024-05-23 PROCEDURE — G0279 TOMOSYNTHESIS, MAMMO: HCPCS

## 2024-07-16 ENCOUNTER — HOSPITAL ENCOUNTER (OUTPATIENT)
Dept: INFUSION THERAPY | Facility: HOSPITAL | Age: 69
Discharge: HOME | End: 2024-07-16
Admitting: FAMILY MEDICINE
Payer: MEDICARE

## 2024-07-16 VITALS
OXYGEN SATURATION: 98 % | SYSTOLIC BLOOD PRESSURE: 125 MMHG | DIASTOLIC BLOOD PRESSURE: 64 MMHG | RESPIRATION RATE: 16 BRPM | TEMPERATURE: 97.2 F | HEART RATE: 60 BPM

## 2024-07-16 DIAGNOSIS — Z00.00 MEDICARE ANNUAL WELLNESS VISIT, SUBSEQUENT: ICD-10-CM

## 2024-07-16 DIAGNOSIS — Z12.31 ENCOUNTER FOR SCREENING MAMMOGRAM FOR MALIGNANT NEOPLASM OF BREAST: ICD-10-CM

## 2024-07-16 DIAGNOSIS — E78.2 MIXED HYPERLIPIDEMIA: ICD-10-CM

## 2024-07-16 DIAGNOSIS — F17.210 CIGARETTE NICOTINE DEPENDENCE WITHOUT COMPLICATION: ICD-10-CM

## 2024-07-16 DIAGNOSIS — M81.0 AGE-RELATED OSTEOPOROSIS WITHOUT CURRENT PATHOLOGICAL FRACTURE: Primary | ICD-10-CM

## 2024-07-16 DIAGNOSIS — E55.9 VITAMIN D DEFICIENCY: ICD-10-CM

## 2024-07-16 LAB
25(OH)D3 SERPL-MCNC: 59.2 NG/ML (ref 30–100)
25(OH)D3 SERPL-MCNC: 60 NG/ML (ref 30–100)
ALBUMIN SERPL-MCNC: 4 G/DL (ref 3.5–5.2)
ALBUMIN SERPL-MCNC: 4.4 G/DL (ref 3.5–5.2)
ALBUMIN/GLOB SERPL: 1.6 G/DL
ALBUMIN/GLOB SERPL: 1.9 G/DL
ALP SERPL-CCNC: 44 U/L (ref 39–117)
ALP SERPL-CCNC: 48 U/L (ref 39–117)
ALT SERPL W P-5'-P-CCNC: 15 U/L (ref 1–33)
ALT SERPL W P-5'-P-CCNC: 16 U/L (ref 1–33)
ANION GAP SERPL CALCULATED.3IONS-SCNC: 8 MMOL/L (ref 5–15)
ANION GAP SERPL CALCULATED.3IONS-SCNC: 9.5 MMOL/L (ref 5–15)
AST SERPL-CCNC: 21 U/L (ref 1–32)
AST SERPL-CCNC: 26 U/L (ref 1–32)
BASOPHILS # BLD AUTO: 0.07 10*3/MM3 (ref 0–0.2)
BASOPHILS NFR BLD AUTO: 0.9 % (ref 0–1.5)
BILIRUB SERPL-MCNC: 0.3 MG/DL (ref 0–1.2)
BILIRUB SERPL-MCNC: 0.3 MG/DL (ref 0–1.2)
BUN SERPL-MCNC: 27 MG/DL (ref 8–23)
BUN SERPL-MCNC: 28 MG/DL (ref 8–23)
BUN/CREAT SERPL: 30 (ref 7–25)
BUN/CREAT SERPL: 30.4 (ref 7–25)
CALCIUM SPEC-SCNC: 8.9 MG/DL (ref 8.6–10.5)
CALCIUM SPEC-SCNC: 9.3 MG/DL (ref 8.6–10.5)
CHLORIDE SERPL-SCNC: 106 MMOL/L (ref 98–107)
CHLORIDE SERPL-SCNC: 108 MMOL/L (ref 98–107)
CHOLEST SERPL-MCNC: 196 MG/DL (ref 0–200)
CO2 SERPL-SCNC: 23.5 MMOL/L (ref 22–29)
CO2 SERPL-SCNC: 25 MMOL/L (ref 22–29)
CREAT SERPL-MCNC: 0.9 MG/DL (ref 0.57–1)
CREAT SERPL-MCNC: 0.92 MG/DL (ref 0.57–1)
DEPRECATED RDW RBC AUTO: 40.6 FL (ref 37–54)
EGFRCR SERPLBLD CKD-EPI 2021: 67.5 ML/MIN/1.73
EGFRCR SERPLBLD CKD-EPI 2021: 69.3 ML/MIN/1.73
EOSINOPHIL # BLD AUTO: 0.65 10*3/MM3 (ref 0–0.4)
EOSINOPHIL NFR BLD AUTO: 8.2 % (ref 0.3–6.2)
ERYTHROCYTE [DISTWIDTH] IN BLOOD BY AUTOMATED COUNT: 12.4 % (ref 12.3–15.4)
GLOBULIN UR ELPH-MCNC: 2.3 GM/DL
GLOBULIN UR ELPH-MCNC: 2.5 GM/DL
GLUCOSE SERPL-MCNC: 92 MG/DL (ref 65–99)
GLUCOSE SERPL-MCNC: 96 MG/DL (ref 65–99)
HCT VFR BLD AUTO: 42 % (ref 34–46.6)
HDLC SERPL-MCNC: 57 MG/DL (ref 40–60)
HGB BLD-MCNC: 13.7 G/DL (ref 12–15.9)
IMM GRANULOCYTES # BLD AUTO: 0.02 10*3/MM3 (ref 0–0.05)
IMM GRANULOCYTES NFR BLD AUTO: 0.3 % (ref 0–0.5)
LDLC SERPL CALC-MCNC: 126 MG/DL (ref 0–100)
LDLC/HDLC SERPL: 2.19 {RATIO}
LYMPHOCYTES # BLD AUTO: 2.75 10*3/MM3 (ref 0.7–3.1)
LYMPHOCYTES NFR BLD AUTO: 34.9 % (ref 19.6–45.3)
MAGNESIUM SERPL-MCNC: 2.2 MG/DL (ref 1.6–2.4)
MAGNESIUM SERPL-MCNC: 2.5 MG/DL (ref 1.6–2.4)
MCH RBC QN AUTO: 29.8 PG (ref 26.6–33)
MCHC RBC AUTO-ENTMCNC: 32.6 G/DL (ref 31.5–35.7)
MCV RBC AUTO: 91.3 FL (ref 79–97)
MONOCYTES # BLD AUTO: 0.64 10*3/MM3 (ref 0.1–0.9)
MONOCYTES NFR BLD AUTO: 8.1 % (ref 5–12)
NEUTROPHILS NFR BLD AUTO: 3.75 10*3/MM3 (ref 1.7–7)
NEUTROPHILS NFR BLD AUTO: 47.6 % (ref 42.7–76)
NRBC BLD AUTO-RTO: 0 /100 WBC (ref 0–0.2)
PHOSPHATE SERPL-MCNC: 3.1 MG/DL (ref 2.5–4.5)
PHOSPHATE SERPL-MCNC: 3.3 MG/DL (ref 2.5–4.5)
PLATELET # BLD AUTO: 222 10*3/MM3 (ref 140–450)
PMV BLD AUTO: 10.4 FL (ref 6–12)
POTASSIUM SERPL-SCNC: 4 MMOL/L (ref 3.5–5.2)
POTASSIUM SERPL-SCNC: 4.1 MMOL/L (ref 3.5–5.2)
PROT SERPL-MCNC: 6.5 G/DL (ref 6–8.5)
PROT SERPL-MCNC: 6.7 G/DL (ref 6–8.5)
RBC # BLD AUTO: 4.6 10*6/MM3 (ref 3.77–5.28)
SODIUM SERPL-SCNC: 139 MMOL/L (ref 136–145)
SODIUM SERPL-SCNC: 141 MMOL/L (ref 136–145)
TRIGL SERPL-MCNC: 70 MG/DL (ref 0–150)
TSH SERPL DL<=0.05 MIU/L-ACNC: 2.3 UIU/ML (ref 0.27–4.2)
VLDLC SERPL-MCNC: 13 MG/DL (ref 5–40)
WBC NRBC COR # BLD AUTO: 7.88 10*3/MM3 (ref 3.4–10.8)

## 2024-07-16 PROCEDURE — 80053 COMPREHEN METABOLIC PANEL: CPT | Performed by: FAMILY MEDICINE

## 2024-07-16 PROCEDURE — 25010000002 DENOSUMAB 60 MG/ML SOLUTION PREFILLED SYRINGE: Performed by: FAMILY MEDICINE

## 2024-07-16 PROCEDURE — 85025 COMPLETE CBC W/AUTO DIFF WBC: CPT | Performed by: FAMILY MEDICINE

## 2024-07-16 PROCEDURE — 80061 LIPID PANEL: CPT | Performed by: FAMILY MEDICINE

## 2024-07-16 PROCEDURE — 83735 ASSAY OF MAGNESIUM: CPT | Performed by: FAMILY MEDICINE

## 2024-07-16 PROCEDURE — 84100 ASSAY OF PHOSPHORUS: CPT | Performed by: FAMILY MEDICINE

## 2024-07-16 PROCEDURE — 84443 ASSAY THYROID STIM HORMONE: CPT | Performed by: FAMILY MEDICINE

## 2024-07-16 PROCEDURE — 82306 VITAMIN D 25 HYDROXY: CPT | Performed by: FAMILY MEDICINE

## 2024-07-16 PROCEDURE — 36415 COLL VENOUS BLD VENIPUNCTURE: CPT

## 2024-07-16 PROCEDURE — 96372 THER/PROPH/DIAG INJ SC/IM: CPT

## 2024-07-16 RX ADMIN — DENOSUMAB 60 MG: 60 INJECTION SUBCUTANEOUS at 09:39

## 2024-07-16 NOTE — NURSING NOTE
Patient arrived to [facility] at 0836.  History and medications reviewed with patient.  Labs drawn.  Medication administered as ordered ordered without complications.  AVS discussed and copy given to patient.  Patient discharged at 0947 in stable condition without complaint.

## 2024-07-16 NOTE — PATIENT INSTRUCTIONS
"  Call  Dr. Nicole [phone]  if you have any problems or concerns.    We know you have a Choice in healthcare and appreciate you using [facility].  Our purpose is to provide you \"Excellent Care\".  We hope that you will always choose us in the future and continue to recommend us to your family and friends.             "

## 2024-07-22 ENCOUNTER — TELEPHONE (OUTPATIENT)
Dept: FAMILY MEDICINE CLINIC | Facility: CLINIC | Age: 69
End: 2024-07-22

## 2024-07-22 ENCOUNTER — OFFICE VISIT (OUTPATIENT)
Dept: FAMILY MEDICINE CLINIC | Facility: CLINIC | Age: 69
End: 2024-07-22
Payer: MEDICARE

## 2024-07-22 VITALS
HEIGHT: 62 IN | TEMPERATURE: 97.3 F | WEIGHT: 100 LBS | OXYGEN SATURATION: 100 % | BODY MASS INDEX: 18.4 KG/M2 | DIASTOLIC BLOOD PRESSURE: 70 MMHG | SYSTOLIC BLOOD PRESSURE: 120 MMHG | HEART RATE: 60 BPM

## 2024-07-22 DIAGNOSIS — F17.210 CIGARETTE NICOTINE DEPENDENCE WITHOUT COMPLICATION: ICD-10-CM

## 2024-07-22 DIAGNOSIS — E78.2 MIXED HYPERLIPIDEMIA: Primary | ICD-10-CM

## 2024-07-22 DIAGNOSIS — E55.9 VITAMIN D DEFICIENCY: ICD-10-CM

## 2024-07-22 DIAGNOSIS — S46.912D STRAIN OF LEFT SHOULDER, SUBSEQUENT ENCOUNTER: ICD-10-CM

## 2024-07-22 DIAGNOSIS — M81.0 AGE-RELATED OSTEOPOROSIS WITHOUT CURRENT PATHOLOGICAL FRACTURE: ICD-10-CM

## 2024-07-22 PROBLEM — S46.912A STRAIN OF LEFT SHOULDER: Status: ACTIVE | Noted: 2024-07-22

## 2024-07-22 PROCEDURE — 1126F AMNT PAIN NOTED NONE PRSNT: CPT | Performed by: FAMILY MEDICINE

## 2024-07-22 PROCEDURE — 99213 OFFICE O/P EST LOW 20 MIN: CPT | Performed by: FAMILY MEDICINE

## 2024-07-22 PROCEDURE — 1159F MED LIST DOCD IN RCRD: CPT | Performed by: FAMILY MEDICINE

## 2024-07-22 PROCEDURE — 1160F RVW MEDS BY RX/DR IN RCRD: CPT | Performed by: FAMILY MEDICINE

## 2024-07-22 RX ORDER — TRAMADOL HYDROCHLORIDE 50 MG/1
50 TABLET ORAL EVERY 6 HOURS PRN
Qty: 30 TABLET | Refills: 0 | Status: SHIPPED | OUTPATIENT
Start: 2024-07-22

## 2024-07-22 NOTE — TELEPHONE ENCOUNTER
Pt will come by office on her way back from Prolia injection to get labs drawn , appointment scheduled.

## 2024-07-22 NOTE — TELEPHONE ENCOUNTER
"Patient was here today & schedule her next AWV & Fasting labs for 01/23/2025.   Patient stated that she gets Prolia every 6 months & has labs. Her next Prolia shot is 01/17/2025.   Patient is asking if her next AWV & Prolia shot can use the same lab results.   Patient stated that she would prefer \"not to be stuck twice\".   Please advise.   "

## 2024-07-22 NOTE — PROGRESS NOTES
Subjective   Violeta Neff is a 68 y.o. female with   Chief Complaint   Patient presents with    Hyperlipidemia    Vitamin D Deficiency   .    Hyperlipidemia       68-year-old white female with multiple medical issues here for further medical management.  Patient with known history of age-related osteoporosis using Prolia on an every 6-month basis.  She does have history of hyperlipidemia and vitamin D deficiency.  She is using over-the-counter vitamin D supplementation and has been trying to control her lipid status with dietary measures alone.  She continues to smoke cigarettes at approximately half a pack per day.  She has no desire at this time to quit.  She has recently strained a left shoulder and is requesting something for pain that she can use in addition to Motrin.  She states that she would use it very seldomly.  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  The following portions of the patient's history were reviewed and updated as appropriate: allergies, current medications, past family history, past medical history, past social history, past surgical history and problem list.    Review of Systems   Cardiovascular:         Hyperlipidemia   Endocrine:        Vitamin D deficiency, tobacco addiction   Musculoskeletal:  Positive for arthralgias.        Age-related osteoporosis       Objective     Vitals:    07/22/24 0848   BP: 120/70   Pulse: 60   Temp: 97.3 °F (36.3 °C)   SpO2: 100%       Recent Results (from the past 672 hour(s))   Vitamin D 25 Hydroxy    Collection Time: 07/16/24  8:41 AM    Specimen: Blood   Result Value Ref Range    25 Hydroxy, Vitamin D 59.2 30.0 - 100.0 ng/ml   Phosphorus    Collection Time: 07/16/24  8:41 AM    Specimen: Blood   Result Value Ref Range    Phosphorus 3.3 2.5 - 4.5 mg/dL   Magnesium    Collection Time: 07/16/24  8:41 AM    Specimen: Blood   Result Value Ref Range    Magnesium 2.2 1.6 -  2.4 mg/dL   Comprehensive Metabolic Panel    Collection Time: 07/16/24  8:41 AM    Specimen: Blood   Result Value Ref Range    Glucose 96 65 - 99 mg/dL    BUN 28 (H) 8 - 23 mg/dL    Creatinine 0.92 0.57 - 1.00 mg/dL    Sodium 139 136 - 145 mmol/L    Potassium 4.0 3.5 - 5.2 mmol/L    Chloride 106 98 - 107 mmol/L    CO2 23.5 22.0 - 29.0 mmol/L    Calcium 8.9 8.6 - 10.5 mg/dL    Total Protein 6.5 6.0 - 8.5 g/dL    Albumin 4.0 3.5 - 5.2 g/dL    ALT (SGPT) 16 1 - 33 U/L    AST (SGOT) 26 1 - 32 U/L    Alkaline Phosphatase 44 39 - 117 U/L    Total Bilirubin 0.3 0.0 - 1.2 mg/dL    Globulin 2.5 gm/dL    A/G Ratio 1.6 g/dL    BUN/Creatinine Ratio 30.4 (H) 7.0 - 25.0    Anion Gap 9.5 5.0 - 15.0 mmol/L    eGFR 67.5 >60.0 mL/min/1.73   Comprehensive metabolic panel    Collection Time: 07/16/24  9:55 AM    Specimen: Blood   Result Value Ref Range    Glucose 92 65 - 99 mg/dL    BUN 27 (H) 8 - 23 mg/dL    Creatinine 0.90 0.57 - 1.00 mg/dL    Sodium 141 136 - 145 mmol/L    Potassium 4.1 3.5 - 5.2 mmol/L    Chloride 108 (H) 98 - 107 mmol/L    CO2 25.0 22.0 - 29.0 mmol/L    Calcium 9.3 8.6 - 10.5 mg/dL    Total Protein 6.7 6.0 - 8.5 g/dL    Albumin 4.4 3.5 - 5.2 g/dL    ALT (SGPT) 15 1 - 33 U/L    AST (SGOT) 21 1 - 32 U/L    Alkaline Phosphatase 48 39 - 117 U/L    Total Bilirubin 0.3 0.0 - 1.2 mg/dL    Globulin 2.3 gm/dL    A/G Ratio 1.9 g/dL    BUN/Creatinine Ratio 30.0 (H) 7.0 - 25.0    Anion Gap 8.0 5.0 - 15.0 mmol/L    eGFR 69.3 >60.0 mL/min/1.73   Phosphorus    Collection Time: 07/16/24  9:55 AM    Specimen: Blood   Result Value Ref Range    Phosphorus 3.1 2.5 - 4.5 mg/dL   Magnesium    Collection Time: 07/16/24  9:55 AM    Specimen: Blood   Result Value Ref Range    Magnesium 2.5 (H) 1.6 - 2.4 mg/dL   TSH    Collection Time: 07/16/24  9:55 AM    Specimen: Blood   Result Value Ref Range    TSH 2.300 0.270 - 4.200 uIU/mL   Vitamin D 25 hydroxy    Collection Time: 07/16/24  9:55 AM    Specimen: Blood   Result Value Ref Range    25  Hydroxy, Vitamin D 60.0 30.0 - 100.0 ng/ml   Lipid panel    Collection Time: 07/16/24  9:55 AM    Specimen: Blood   Result Value Ref Range    Total Cholesterol 196 0 - 200 mg/dL    Triglycerides 70 0 - 150 mg/dL    HDL Cholesterol 57 40 - 60 mg/dL    LDL Cholesterol  126 (H) 0 - 100 mg/dL    VLDL Cholesterol 13 5 - 40 mg/dL    LDL/HDL Ratio 2.19    CBC Auto Differential    Collection Time: 07/16/24  9:55 AM    Specimen: Blood   Result Value Ref Range    WBC 7.88 3.40 - 10.80 10*3/mm3    RBC 4.60 3.77 - 5.28 10*6/mm3    Hemoglobin 13.7 12.0 - 15.9 g/dL    Hematocrit 42.0 34.0 - 46.6 %    MCV 91.3 79.0 - 97.0 fL    MCH 29.8 26.6 - 33.0 pg    MCHC 32.6 31.5 - 35.7 g/dL    RDW 12.4 12.3 - 15.4 %    RDW-SD 40.6 37.0 - 54.0 fl    MPV 10.4 6.0 - 12.0 fL    Platelets 222 140 - 450 10*3/mm3    Neutrophil % 47.6 42.7 - 76.0 %    Lymphocyte % 34.9 19.6 - 45.3 %    Monocyte % 8.1 5.0 - 12.0 %    Eosinophil % 8.2 (H) 0.3 - 6.2 %    Basophil % 0.9 0.0 - 1.5 %    Immature Grans % 0.3 0.0 - 0.5 %    Neutrophils, Absolute 3.75 1.70 - 7.00 10*3/mm3    Lymphocytes, Absolute 2.75 0.70 - 3.10 10*3/mm3    Monocytes, Absolute 0.64 0.10 - 0.90 10*3/mm3    Eosinophils, Absolute 0.65 (H) 0.00 - 0.40 10*3/mm3    Basophils, Absolute 0.07 0.00 - 0.20 10*3/mm3    Immature Grans, Absolute 0.02 0.00 - 0.05 10*3/mm3    nRBC 0.0 0.0 - 0.2 /100 WBC       Physical Exam  Vitals and nursing note reviewed.   Constitutional:       Appearance: Normal appearance. She is well-developed and well-groomed.   HENT:      Head: Normocephalic and atraumatic.   Neck:      Thyroid: No thyroid mass or thyromegaly.      Vascular: Normal carotid pulses. No carotid bruit.      Trachea: Trachea and phonation normal.   Cardiovascular:      Rate and Rhythm: Normal rate and regular rhythm.      Heart sounds: Normal heart sounds. No murmur heard.     No friction rub. No gallop.   Pulmonary:      Effort: Pulmonary effort is normal. No respiratory distress.      Breath sounds:  Normal breath sounds. No decreased breath sounds, wheezing, rhonchi or rales.   Musculoskeletal:      Cervical back: Neck supple.   Lymphadenopathy:      Cervical: No cervical adenopathy.   Skin:     General: Skin is warm and dry.      Findings: No rash.   Neurological:      Mental Status: She is alert and oriented to person, place, and time.   Psychiatric:         Attention and Perception: Attention and perception normal.         Mood and Affect: Mood and affect normal.         Speech: Speech normal.         Behavior: Behavior normal. Behavior is cooperative.         Thought Content: Thought content normal.         Cognition and Memory: Cognition and memory normal.         Judgment: Judgment normal.         Assessment & Plan   Diagnoses and all orders for this visit:    1. Mixed hyperlipidemia (Primary)  -     Comprehensive metabolic panel; Future  -     Vitamin D 25 hydroxy; Future  -     TSH; Future  -     Lipid panel; Future  -     CBC w AUTO Differential; Future  -     Magnesium; Future  -     Phosphorus; Future    2. Cigarette nicotine dependence without complication  -     Comprehensive metabolic panel; Future  -     Vitamin D 25 hydroxy; Future  -     TSH; Future  -     Lipid panel; Future  -     CBC w AUTO Differential; Future  -     Magnesium; Future  -     Phosphorus; Future    3. Vitamin D deficiency  -     Comprehensive metabolic panel; Future  -     Vitamin D 25 hydroxy; Future  -     TSH; Future  -     Lipid panel; Future  -     CBC w AUTO Differential; Future  -     Magnesium; Future  -     Phosphorus; Future    4. Age-related osteoporosis without current pathological fracture  -     Comprehensive metabolic panel; Future  -     Vitamin D 25 hydroxy; Future  -     TSH; Future  -     Lipid panel; Future  -     CBC w AUTO Differential; Future  -     Magnesium; Future  -     Phosphorus; Future    5. Strain of left shoulder, subsequent encounter  -     traMADol (ULTRAM) 50 MG tablet; Take 1 tablet by  mouth Every 6 (Six) Hours As Needed for Moderate Pain.  Dispense: 30 tablet; Refill: 0  -     Comprehensive metabolic panel; Future  -     Vitamin D 25 hydroxy; Future  -     TSH; Future  -     Lipid panel; Future  -     CBC w AUTO Differential; Future  -     Magnesium; Future  -     Phosphorus; Future      Permits were signed and Saturnino run in regards to tramadol use.  Anticipate follow-up in 6 months with patient applauded at her efforts to lower LDL cholesterol.  Return in about 6 months (around 1/22/2025) for Recheck.

## 2024-08-12 DIAGNOSIS — S46.912D STRAIN OF LEFT SHOULDER, SUBSEQUENT ENCOUNTER: ICD-10-CM

## 2024-08-12 RX ORDER — TRAMADOL HYDROCHLORIDE 50 MG/1
50 TABLET ORAL EVERY 6 HOURS PRN
Qty: 30 TABLET | Refills: 0 | Status: SHIPPED | OUTPATIENT
Start: 2024-08-12

## 2024-08-12 NOTE — TELEPHONE ENCOUNTER
LV-7/22/24  NV-1/23/25  LF-7/22/24  UDS-None on file  CONTRACT-7/22/24    Please advise for refill.

## 2024-10-01 DIAGNOSIS — S46.912D STRAIN OF LEFT SHOULDER, SUBSEQUENT ENCOUNTER: ICD-10-CM

## 2024-10-01 RX ORDER — TRAMADOL HYDROCHLORIDE 50 MG/1
50 TABLET ORAL EVERY 6 HOURS PRN
Qty: 30 TABLET | Refills: 0 | Status: SHIPPED | OUTPATIENT
Start: 2024-10-01

## 2024-10-21 DIAGNOSIS — S46.912D STRAIN OF LEFT SHOULDER, SUBSEQUENT ENCOUNTER: ICD-10-CM

## 2024-10-21 RX ORDER — TRAMADOL HYDROCHLORIDE 50 MG/1
50 TABLET ORAL EVERY 6 HOURS PRN
Qty: 30 TABLET | Refills: 0 | Status: SHIPPED | OUTPATIENT
Start: 2024-10-21

## 2024-10-21 NOTE — TELEPHONE ENCOUNTER
LV-7/22/24  NV-1/23/2025  LF-10/1/24  UDS-None on file  CONTRACT-7/22/24    Please advise for refill.

## 2024-11-10 DIAGNOSIS — S46.912D STRAIN OF LEFT SHOULDER, SUBSEQUENT ENCOUNTER: ICD-10-CM

## 2024-11-11 RX ORDER — TRAMADOL HYDROCHLORIDE 50 MG/1
50 TABLET ORAL EVERY 6 HOURS PRN
Qty: 30 TABLET | Refills: 0 | Status: SHIPPED | OUTPATIENT
Start: 2024-11-11

## 2024-11-11 NOTE — TELEPHONE ENCOUNTER
LV-7/22/24  NV-1/23/2025  LF-10/21/24  UDS-None on file  CONTRACT-7/22/24    Please advise for refill.

## 2024-12-01 DIAGNOSIS — S46.912D STRAIN OF LEFT SHOULDER, SUBSEQUENT ENCOUNTER: ICD-10-CM

## 2024-12-02 RX ORDER — TRAMADOL HYDROCHLORIDE 50 MG/1
50 TABLET ORAL EVERY 6 HOURS PRN
Qty: 30 TABLET | Refills: 0 | Status: SHIPPED | OUTPATIENT
Start: 2024-12-02

## 2024-12-09 ENCOUNTER — TELEPHONE (OUTPATIENT)
Dept: FAMILY MEDICINE CLINIC | Facility: CLINIC | Age: 69
End: 2024-12-09
Payer: MEDICARE

## 2024-12-12 DIAGNOSIS — M81.0 AGE-RELATED OSTEOPOROSIS WITHOUT CURRENT PATHOLOGICAL FRACTURE: Primary | ICD-10-CM

## 2024-12-17 DIAGNOSIS — S46.912D STRAIN OF LEFT SHOULDER, SUBSEQUENT ENCOUNTER: ICD-10-CM

## 2024-12-18 RX ORDER — TRAMADOL HYDROCHLORIDE 50 MG/1
50 TABLET ORAL EVERY 6 HOURS PRN
Qty: 30 TABLET | Refills: 0 | Status: SHIPPED | OUTPATIENT
Start: 2024-12-18

## 2024-12-20 ENCOUNTER — TELEPHONE (OUTPATIENT)
Dept: FAMILY MEDICINE CLINIC | Facility: CLINIC | Age: 69
End: 2024-12-20

## 2024-12-20 NOTE — TELEPHONE ENCOUNTER
Caller: Violeta Neff    Relationship to patient: Self    Best call back number: [phone]     Chief complaint: RESCHEDULING APPOINTMENT, [facility] DOES NOT HAVE AVAILABILITY    Type of visit: MEDICARE WELLNESS VISIT    Requested date: WEEK OF 1.27.25    If rescheduling, when is the original appointment: ORIGINAL WAS SET FOR 1.23.25    Additional notes:  PATIENT IS WORKING WITH DR ON HER OSTEOPOROSIS, GETTING HER INJECTION ON 1.24.25 AND THIS VISIT NEEDS TO BE AFTER. SHE USUALLY SCHEDULES WITH THE DOCTOR ABOUT A WEEK AFTER THE INJECTION, BUT [facility] DOES NOT HAVE AVAILABILITY.    PLEASE CALL TO SCHEDULE.

## 2025-01-02 DIAGNOSIS — S46.912D STRAIN OF LEFT SHOULDER, SUBSEQUENT ENCOUNTER: ICD-10-CM

## 2025-01-02 RX ORDER — TRAMADOL HYDROCHLORIDE 50 MG/1
50 TABLET ORAL EVERY 6 HOURS PRN
Qty: 30 TABLET | Refills: 0 | Status: SHIPPED | OUTPATIENT
Start: 2025-01-02

## 2025-01-02 NOTE — TELEPHONE ENCOUNTER
Bonnie patient:    LV-7/22/24  NV-1/30/25  LF-12/18/24  UDS-None on file  CONTRACT-7/22/24    Please advise for refill.

## 2025-01-07 ENCOUNTER — TELEPHONE (OUTPATIENT)
Dept: FAMILY MEDICINE CLINIC | Facility: CLINIC | Age: 70
End: 2025-01-07

## 2025-01-07 RX ORDER — ALBUTEROL SULFATE 90 UG/1
2 INHALANT RESPIRATORY (INHALATION)
COMMUNITY
Start: 2025-01-02

## 2025-01-07 NOTE — TELEPHONE ENCOUNTER
Caller: Violeta Neff    Relationship: Self    Best call back number: [phone]     What medication are you requesting: ANTIBIOTIC    What are your current symptoms: SWOLLEN GLAND ON LEFT SIDE OF NECK, FATIGUE, COUGH AND RESPIRATORY     How long have you been experiencing symptoms: STARTED 12/28/24    Have you had these symptoms before:    [x] Yes  [] No    Have you been treated for these symptoms before:   [x] Yes  [] No    If a prescription is needed, what is your preferred pharmacy and phone number:  [facility] [id number] - [address] AT [facility] - [phone]  - [phone] FX     Additional notes:COMPLETED ZITHROMAX PATIENT STATES SHE WENT TO [facility] EMERGENCY ROOM 1/2/25 AND IS STILL NOT BETTER     PATIENT STATES SHE CANNOT COME IN AND MISS  ANOTHER DAY OF WORK

## 2025-01-08 ENCOUNTER — OFFICE VISIT (OUTPATIENT)
Dept: FAMILY MEDICINE CLINIC | Facility: CLINIC | Age: 70
End: 2025-01-08
Payer: MEDICARE

## 2025-01-08 VITALS
WEIGHT: 101.1 LBS | BODY MASS INDEX: 18.61 KG/M2 | SYSTOLIC BLOOD PRESSURE: 102 MMHG | OXYGEN SATURATION: 98 % | HEIGHT: 62 IN | HEART RATE: 66 BPM | TEMPERATURE: 98.2 F | DIASTOLIC BLOOD PRESSURE: 64 MMHG

## 2025-01-08 DIAGNOSIS — J40 BRONCHITIS: Primary | ICD-10-CM

## 2025-01-08 PROCEDURE — 1159F MED LIST DOCD IN RCRD: CPT | Performed by: FAMILY MEDICINE

## 2025-01-08 PROCEDURE — 99213 OFFICE O/P EST LOW 20 MIN: CPT | Performed by: FAMILY MEDICINE

## 2025-01-08 PROCEDURE — 1126F AMNT PAIN NOTED NONE PRSNT: CPT | Performed by: FAMILY MEDICINE

## 2025-01-08 PROCEDURE — 1160F RVW MEDS BY RX/DR IN RCRD: CPT | Performed by: FAMILY MEDICINE

## 2025-01-08 RX ORDER — DOXYCYCLINE 100 MG/1
100 CAPSULE ORAL 2 TIMES DAILY
Qty: 20 CAPSULE | Refills: 0 | Status: SHIPPED | OUTPATIENT
Start: 2025-01-08 | End: 2025-01-18

## 2025-01-08 RX ORDER — METHYLPREDNISOLONE 4 MG/1
TABLET ORAL
Qty: 21 TABLET | Refills: 0 | Status: SHIPPED | OUTPATIENT
Start: 2025-01-08

## 2025-01-08 NOTE — PROGRESS NOTES
"  Albert Neff is a 69 y.o. female who is here for   Chief Complaint   Patient presents with    Cough    Facial Swelling   .     Cough  This is a new problem. Episode onset: 2 weeks ago. The cough is Productive of sputum. Associated symptoms include nasal congestion, shortness of breath and wheezing. Pertinent negatives include no chest pain, chills, ear congestion, ear pain, fever or rash. Nothing aggravates the symptoms. Treatments tried: patient tried  azithromycin, decadron, and albuterol inhaler. The treatment provided no relief. Longstanding history of smoking.      History of Present Illness      Review of Systems   Constitutional:  Negative for chills and fever.   HENT:  Positive for facial swelling. Negative for ear pain.    Respiratory:  Positive for cough, shortness of breath and wheezing.    Cardiovascular:  Negative for chest pain.   Skin:  Negative for rash.       Objective   Vitals:    01/08/25 0807   BP: 102/64   Pulse: 66   Temp: 98.2 °F (36.8 °C)   TempSrc: Infrared   SpO2: 98%   Weight: 45.9 kg (101 lb 1.6 oz)   Height: 157.5 cm (62\")      Physical Exam  Vitals and nursing note reviewed.   Constitutional:       Appearance: Normal appearance. She is normal weight.   HENT:      Head: Normocephalic and atraumatic.   Cardiovascular:      Rate and Rhythm: Normal rate and regular rhythm.      Pulses: Normal pulses.      Heart sounds: No murmur heard.  Pulmonary:      Effort: Pulmonary effort is normal. No respiratory distress.      Breath sounds: Wheezing present.   Skin:     General: Skin is warm and dry.   Neurological:      General: No focal deficit present.      Mental Status: She is alert.   Psychiatric:         Mood and Affect: Mood normal.         Thought Content: Thought content normal.       Physical Exam        Assessment & Plan   Assessment & Plan    Diagnoses and all orders for this visit:    1. Bronchitis (Primary)  We will start doxycycline 100 mg 1 p.o. twice daily x 10 " days.  Will also start Medrol Dosepak.  Will hold calcium supplements while taking the antibiotic as this could affect absorption.  Discussed importance of quitting smoking.  Patient may need to have follow-up with pulmonary function test to evaluate for possible COPD given her longstanding smoking history.  -     doxycycline (MONODOX) 100 MG capsule; Take 1 capsule by mouth 2 (Two) Times a Day for 10 days.  Dispense: 20 capsule; Refill: 0  -     methylPREDNISolone (MEDROL) 4 MG dose pack; Take as directed on package instructions.  Dispense: 21 tablet; Refill: 0      Results      There are no Patient Instructions on file for this visit.    There are no discontinued medications.     Return if symptoms worsen or fail to improve.  BMI is below normal parameters (malnutrition). Recommendations: none (medical contraindication)        Richard Vitale MD  [place], [place]

## 2025-01-22 DIAGNOSIS — S46.912D STRAIN OF LEFT SHOULDER, SUBSEQUENT ENCOUNTER: ICD-10-CM

## 2025-01-22 RX ORDER — TRAMADOL HYDROCHLORIDE 50 MG/1
50 TABLET ORAL EVERY 6 HOURS PRN
Qty: 30 TABLET | Refills: 0 | Status: SHIPPED | OUTPATIENT
Start: 2025-01-22

## 2025-01-22 NOTE — TELEPHONE ENCOUNTER
LV-7/22/24  NV-1/30/25  LF-1/2/25  UDS-None on file  CONTRACT-7/22/24    Please advise for refill.

## 2025-01-22 NOTE — TELEPHONE ENCOUNTER
Caller: Violeta Neff    Relationship: Self    Best call back number: [phone]     Requested Prescriptions:   Requested Prescriptions     Pending Prescriptions Disp Refills    traMADol (ULTRAM) 50 MG tablet 30 tablet 0     Sig: Take 1 tablet by mouth Every 6 (Six) Hours As Needed for Moderate Pain.        Pharmacy where request should be sent: [facility] [id number] [address] & [facility] - [phone] [facility] [phone] FX     Last office visit with prescribing clinician: 7/22/2024   Last telemedicine visit with prescribing clinician: Visit date not found   Next office visit with prescribing clinician: 1/30/2025     Additional details provided by patient: PATIENT IS OUT OF THIS MEDICATION    Does the patient have less than a 3 day supply:  [x] Yes  [] No    Would you like a call back once the refill request has been completed: [] Yes [] No    If the office needs to give you a call back, can they leave a voicemail: [] Yes [] No    Elenita Hinson Rep   01/22/25 10:11 EST

## 2025-01-23 DIAGNOSIS — F17.210 CIGARETTE NICOTINE DEPENDENCE WITHOUT COMPLICATION: ICD-10-CM

## 2025-01-23 DIAGNOSIS — E55.9 VITAMIN D DEFICIENCY: ICD-10-CM

## 2025-01-23 DIAGNOSIS — E78.2 MIXED HYPERLIPIDEMIA: ICD-10-CM

## 2025-01-23 DIAGNOSIS — M81.0 AGE-RELATED OSTEOPOROSIS WITHOUT CURRENT PATHOLOGICAL FRACTURE: ICD-10-CM

## 2025-01-23 DIAGNOSIS — S46.912D STRAIN OF LEFT SHOULDER, SUBSEQUENT ENCOUNTER: ICD-10-CM

## 2025-01-24 ENCOUNTER — HOSPITAL ENCOUNTER (OUTPATIENT)
Dept: INFUSION THERAPY | Facility: HOSPITAL | Age: 70
Discharge: HOME | End: 2025-01-24
Payer: MEDICARE

## 2025-01-24 VITALS
DIASTOLIC BLOOD PRESSURE: 67 MMHG | WEIGHT: 100 LBS | RESPIRATION RATE: 16 BRPM | HEART RATE: 64 BPM | TEMPERATURE: 97.6 F | BODY MASS INDEX: 18.29 KG/M2 | OXYGEN SATURATION: 99 % | SYSTOLIC BLOOD PRESSURE: 121 MMHG

## 2025-01-24 DIAGNOSIS — M81.0 AGE-RELATED OSTEOPOROSIS WITHOUT CURRENT PATHOLOGICAL FRACTURE: Primary | ICD-10-CM

## 2025-01-24 LAB
CALCIUM SPEC-SCNC: 9.1 MG/DL (ref 8.6–10.5)
MAGNESIUM SERPL-MCNC: 2.1 MG/DL (ref 1.6–2.4)
PHOSPHATE SERPL-MCNC: 3.3 MG/DL (ref 2.5–4.5)

## 2025-01-24 PROCEDURE — 36415 COLL VENOUS BLD VENIPUNCTURE: CPT

## 2025-01-24 PROCEDURE — 84100 ASSAY OF PHOSPHORUS: CPT | Performed by: FAMILY MEDICINE

## 2025-01-24 PROCEDURE — 82310 ASSAY OF CALCIUM: CPT | Performed by: FAMILY MEDICINE

## 2025-01-24 PROCEDURE — 25010000002 DENOSUMAB 60 MG/ML SOLUTION PREFILLED SYRINGE: Performed by: FAMILY MEDICINE

## 2025-01-24 PROCEDURE — 83735 ASSAY OF MAGNESIUM: CPT | Performed by: FAMILY MEDICINE

## 2025-01-24 PROCEDURE — 96372 THER/PROPH/DIAG INJ SC/IM: CPT

## 2025-01-24 RX ADMIN — DENOSUMAB 60 MG: 60 INJECTION SUBCUTANEOUS at 11:16

## 2025-01-24 NOTE — NURSING NOTE
1004 Patient arrives to [facility] in stable condition. History and medications reviewed with patient. VSS. Labs drawn as ordered. Injection given as ordered, patient tolerated well. AVS printed and copy to patient. Patient discharged from [facility] in stable condition without any complaints at 1127.

## 2025-01-24 NOTE — PATIENT INSTRUCTIONS
"Call [facility] at [phone] if you have any problems or concerns.    We know you have a Choice in healthcare and appreciate you using [facility].  Our purpose is to provide you \"Excellent Care\".  We hope that you will always choose us in the future and continue to recommend us to your family and friends.     "

## 2025-01-25 LAB
25(OH)D3+25(OH)D2 SERPL-MCNC: 54.6 NG/ML (ref 30–100)
ALBUMIN SERPL-MCNC: 3.8 G/DL (ref 3.5–5.2)
ALBUMIN/GLOB SERPL: 1.5 G/DL
ALP SERPL-CCNC: 67 U/L (ref 39–117)
ALT SERPL-CCNC: 19 U/L (ref 1–33)
AST SERPL-CCNC: 25 U/L (ref 1–32)
BASOPHILS # BLD AUTO: 0.06 10*3/MM3 (ref 0–0.2)
BASOPHILS NFR BLD AUTO: 0.8 % (ref 0–1.5)
BILIRUB SERPL-MCNC: 0.3 MG/DL (ref 0–1.2)
BUN SERPL-MCNC: 21 MG/DL (ref 8–23)
BUN/CREAT SERPL: 23.1 (ref 7–25)
CALCIUM SERPL-MCNC: 9.5 MG/DL (ref 8.6–10.5)
CHLORIDE SERPL-SCNC: 105 MMOL/L (ref 98–107)
CHOLEST SERPL-MCNC: 193 MG/DL (ref 0–200)
CO2 SERPL-SCNC: 27.5 MMOL/L (ref 22–29)
CREAT SERPL-MCNC: 0.91 MG/DL (ref 0.57–1)
EGFRCR SERPLBLD CKD-EPI 2021: 68 ML/MIN/1.73
EOSINOPHIL # BLD AUTO: 0.49 10*3/MM3 (ref 0–0.4)
EOSINOPHIL NFR BLD AUTO: 6.3 % (ref 0.3–6.2)
ERYTHROCYTE [DISTWIDTH] IN BLOOD BY AUTOMATED COUNT: 12.4 % (ref 12.3–15.4)
GLOBULIN SER CALC-MCNC: 2.6 GM/DL
GLUCOSE SERPL-MCNC: 86 MG/DL (ref 65–99)
HCT VFR BLD AUTO: 45.2 % (ref 34–46.6)
HDLC SERPL-MCNC: 58 MG/DL (ref 40–60)
HGB BLD-MCNC: 14.8 G/DL (ref 12–15.9)
IMM GRANULOCYTES # BLD AUTO: 0.03 10*3/MM3 (ref 0–0.05)
IMM GRANULOCYTES NFR BLD AUTO: 0.4 % (ref 0–0.5)
LDLC SERPL CALC-MCNC: 120 MG/DL (ref 0–100)
LYMPHOCYTES # BLD AUTO: 2.96 10*3/MM3 (ref 0.7–3.1)
LYMPHOCYTES NFR BLD AUTO: 38.2 % (ref 19.6–45.3)
MAGNESIUM SERPL-MCNC: 2.1 MG/DL (ref 1.6–2.4)
MCH RBC QN AUTO: 30.1 PG (ref 26.6–33)
MCHC RBC AUTO-ENTMCNC: 32.7 G/DL (ref 31.5–35.7)
MCV RBC AUTO: 92.1 FL (ref 79–97)
MONOCYTES # BLD AUTO: 0.65 10*3/MM3 (ref 0.1–0.9)
MONOCYTES NFR BLD AUTO: 8.4 % (ref 5–12)
NEUTROPHILS # BLD AUTO: 3.55 10*3/MM3 (ref 1.7–7)
NEUTROPHILS NFR BLD AUTO: 45.9 % (ref 42.7–76)
NRBC BLD AUTO-RTO: 0 /100 WBC (ref 0–0.2)
PHOSPHATE SERPL-MCNC: 3.4 MG/DL (ref 2.5–4.5)
PLATELET # BLD AUTO: 221 10*3/MM3 (ref 140–450)
POTASSIUM SERPL-SCNC: 4.7 MMOL/L (ref 3.5–5.2)
PROT SERPL-MCNC: 6.4 G/DL (ref 6–8.5)
RBC # BLD AUTO: 4.91 10*6/MM3 (ref 3.77–5.28)
SODIUM SERPL-SCNC: 143 MMOL/L (ref 136–145)
TRIGL SERPL-MCNC: 83 MG/DL (ref 0–150)
TSH SERPL DL<=0.005 MIU/L-ACNC: 1.99 UIU/ML (ref 0.27–4.2)
VLDLC SERPL CALC-MCNC: 15 MG/DL (ref 5–40)
WBC # BLD AUTO: 7.74 10*3/MM3 (ref 3.4–10.8)

## 2025-01-30 ENCOUNTER — OFFICE VISIT (OUTPATIENT)
Dept: FAMILY MEDICINE CLINIC | Facility: CLINIC | Age: 70
End: 2025-01-30
Payer: MEDICARE

## 2025-01-30 VITALS
WEIGHT: 98.3 LBS | HEIGHT: 62 IN | BODY MASS INDEX: 18.09 KG/M2 | TEMPERATURE: 97.3 F | SYSTOLIC BLOOD PRESSURE: 118 MMHG | HEART RATE: 70 BPM | DIASTOLIC BLOOD PRESSURE: 72 MMHG | OXYGEN SATURATION: 97 %

## 2025-01-30 DIAGNOSIS — E55.9 VITAMIN D DEFICIENCY: ICD-10-CM

## 2025-01-30 DIAGNOSIS — Z00.00 MEDICARE ANNUAL WELLNESS VISIT, SUBSEQUENT: Primary | ICD-10-CM

## 2025-01-30 DIAGNOSIS — Z79.899 HIGH RISK MEDICATION USE: ICD-10-CM

## 2025-01-30 DIAGNOSIS — F17.210 CIGARETTE NICOTINE DEPENDENCE WITHOUT COMPLICATION: ICD-10-CM

## 2025-01-30 DIAGNOSIS — M81.0 AGE-RELATED OSTEOPOROSIS WITHOUT CURRENT PATHOLOGICAL FRACTURE: ICD-10-CM

## 2025-01-30 DIAGNOSIS — S46.912D STRAIN OF LEFT SHOULDER, SUBSEQUENT ENCOUNTER: ICD-10-CM

## 2025-01-30 DIAGNOSIS — E78.2 MIXED HYPERLIPIDEMIA: ICD-10-CM

## 2025-01-30 PROCEDURE — 1170F FXNL STATUS ASSESSED: CPT | Performed by: FAMILY MEDICINE

## 2025-01-30 PROCEDURE — 1126F AMNT PAIN NOTED NONE PRSNT: CPT | Performed by: FAMILY MEDICINE

## 2025-01-30 PROCEDURE — G0439 PPPS, SUBSEQ VISIT: HCPCS | Performed by: FAMILY MEDICINE

## 2025-01-30 PROCEDURE — 1160F RVW MEDS BY RX/DR IN RCRD: CPT | Performed by: FAMILY MEDICINE

## 2025-01-30 PROCEDURE — 1159F MED LIST DOCD IN RCRD: CPT | Performed by: FAMILY MEDICINE

## 2025-01-30 PROCEDURE — 99213 OFFICE O/P EST LOW 20 MIN: CPT | Performed by: FAMILY MEDICINE

## 2025-01-30 NOTE — ASSESSMENT & PLAN NOTE
Orders:    Compliance Drug Analysis, Ur - Urine, Clean Catch    Comprehensive metabolic panel; Future    Vitamin D 25 hydroxy; Future    TSH; Future    Lipid panel; Future    CBC w AUTO Differential; Future    Magnesium; Future    Phosphorus; Future

## 2025-01-30 NOTE — PROGRESS NOTES
Subjective   The ABCs of the Annual Wellness Visit  Medicare Wellness Visit      Violeta Neff is a 69 y.o. patient who presents for a Medicare Wellness Visit.    The following portions of the patient's history were reviewed and   updated as appropriate: allergies, current medications, past family history, past medical history, past social history, past surgical history, and problem list.    Compared to one year ago, the patient's physical   health is the same.  Compared to one year ago, the patient's mental   health is the same.    Recent Hospitalizations:  She was not admitted to the hospital during the last year.     Current Medical Providers:  Patient Care Team:  Baudilio Nicole DO as PCP - General (Family Medicine)    Outpatient Medications Prior to Visit   Medication Sig Dispense Refill    albuterol sulfate  (90 Base) MCG/ACT inhaler Inhale 2 puffs.      Calcium Carbonate-Vitamin D (CALTRATE 600+D PO) Take 1,200 mg by mouth 2 (Two) Times a Day.      denosumab (Prolia) 60 MG/ML solution prefilled syringe syringe Inject 1 mL under the skin into the appropriate area as directed Every 6 (Six) Months.      ibuprofen (ADVIL,MOTRIN) 400 MG tablet Take 1 tablet by mouth Every 6 (Six) Hours As Needed for Mild Pain.      multivitamins-minerals (PRESERVISION AREDS 2) capsule capsule Take 1 capsule by mouth 2 (Two) Times a Day.      traMADol (ULTRAM) 50 MG tablet Take 1 tablet by mouth Every 6 (Six) Hours As Needed for Moderate Pain. 30 tablet 0    methylPREDNISolone (MEDROL) 4 MG dose pack Take as directed on package instructions. (Patient not taking: Reported on 1/24/2025) 21 tablet 0     No facility-administered medications prior to visit.     Opioid medication/s are on active medication list.  and I have evaluated her active treatment plan and pain score trends (see table).  Vitals:    01/30/25 1418   PainSc: 0-No pain     I have reviewed the chart for potential of high risk medication and harmful drug  "interactions in the elderly.        Aspirin is not on active medication list.  Aspirin use is not indicated based on review of current medical condition/s. Risk of harm outweighs potential benefits.  .    Patient Active Problem List   Diagnosis    Age-related osteoporosis without current pathological fracture    Mixed hyperlipidemia    Closed displaced fracture of second metatarsal bone of left foot    Leukocytosis    Cigarette nicotine dependence without complication    Vitamin D deficiency    Peroneal tendonitis, left    Sprain of left ankle    Closed nondisplaced fracture of navicular bone of left foot    Posterior tibial tendonitis, left    Tear of peroneal tendon    Closed nondisplaced fracture of anterior process of left calcaneus    Strain of left shoulder     Advance Care Planning Advance Directive is not on file.  ACP discussion was held with the patient during this visit. Patient does not have an advance directive, information provided.            Objective   Vitals:    01/30/25 1418   BP: 118/72   BP Location: Left arm   Patient Position: Sitting   Cuff Size: Adult   Pulse: 70   Temp: 97.3 °F (36.3 °C)   TempSrc: Infrared   SpO2: 97%   Weight: 44.6 kg (98 lb 4.8 oz)   Height: 157.5 cm (62.01\")   PainSc: 0-No pain       Estimated body mass index is 17.97 kg/m² as calculated from the following:    Height as of this encounter: 157.5 cm (62.01\").    Weight as of this encounter: 44.6 kg (98 lb 4.8 oz).                Does the patient have evidence of cognitive impairment? No  Lab Results   Component Value Date    CHLPL 193 01/24/2025    TRIG 83 01/24/2025    HDL 58 01/24/2025     (H) 01/24/2025    VLDL 15 01/24/2025                                                                                                Health  Risk Assessment    Smoking Status:  Social History     Tobacco Use   Smoking Status Every Day    Current packs/day: 0.50    Average packs/day: 0.5 packs/day for 53.1 years (26.5 ttl pk-yrs) "    Types: Cigarettes    Start date:    Smokeless Tobacco Never     Alcohol Consumption:  Social History     Substance and Sexual Activity   Alcohol Use No       Fall Risk Screen  DONISADI Fall Risk Assessment was completed, and patient is at LOW risk for falls.Assessment completed on:2025    Depression Screening   Little interest or pleasure in doing things? Not at all   Feeling down, depressed, or hopeless? Not at all   PHQ-2 Total Score 0      Health Habits and Functional and Cognitive Screenin/30/2025     2:21 PM   Functional & Cognitive Status   Do you have difficulty preparing food and eating? No   Do you have difficulty bathing yourself, getting dressed or grooming yourself? No   Do you have difficulty using the toilet? No   Do you have difficulty moving around from place to place? No   Do you have trouble with steps or getting out of a bed or a chair? No   Current Diet Well Balanced Diet   Dental Exam Up to date   Eye Exam Up to date   Exercise (times per week) 4 times per week   Current Exercises Include Walking   Do you need help using the phone?  No   Are you deaf or do you have serious difficulty hearing?  No   Do you need help to go to places out of walking distance? No   Do you need help shopping? No   Do you need help preparing meals?  No   Do you need help with housework?  No   Do you need help with laundry? No   Do you need help taking your medications? No   Do you need help managing money? No   Do you ever drive or ride in a car without wearing a seat belt? No   Have you felt unusual stress, anger or loneliness in the last month? No   Who do you live with? Spouse   If you need help, do you have trouble finding someone available to you? No   Have you been bothered in the last four weeks by sexual problems? No   Do you have difficulty concentrating, remembering or making decisions? No           Age-appropriate Screening Schedule:  Refer to the list below for future screening  recommendations based on patient's age, sex and/or medical conditions. Orders for these recommended tests are listed in the plan section. The patient has been provided with a written plan.    Health Maintenance List  Health Maintenance   Topic Date Due    LUNG CANCER SCREENING  Never done    ZOSTER VACCINE (2 of 3) 09/22/2017    PAP SMEAR  04/12/2020    BMI FOLLOWUP  01/22/2025    COVID-19 Vaccine (4 - 2024-25 season) 02/01/2025 (Originally 9/1/2024)    INFLUENZA VACCINE  03/31/2025 (Originally 7/1/2024)    HEPATITIS C SCREENING  12/31/2025 (Originally 1/23/2017)    TDAP/TD VACCINES (1 - Tdap) 12/31/2025 (Originally 11[birth year]27/1974)    Pneumococcal Vaccine 65+ (1 of 2 - PCV) 01/30/2026 (Originally [birth year]27/1974)    DXA SCAN  09/12/2025    LIPID PANEL  01/24/2026    ANNUAL WELLNESS VISIT  01/30/2026    MAMMOGRAM  05/23/2026    COLORECTAL CANCER SCREENING  07/24/2026                                                                                                                                                CMS Preventative Services Quick Reference  Risk Factors Identified During Encounter  Hearing Problem:  Patient already has hearing aid  Immunizations Discussed/Encouraged: Influenza  Tobacco Use/Dependance Risk (use dotphrase .tobaccocessation for documentation)  Dental Screening Recommended  Vision Screening Recommended    The above risks/problems have been discussed with the patient.  Pertinent information has been shared with the patient in the After Visit Summary.  An After Visit Summary and PPPS were made available to the patient.    Follow Up:   Next Medicare Wellness visit to be scheduled in 1 year.         Additional E&M Note during same encounter follows:  Patient has additional, significant, and separately identifiable condition(s)/problem(s) that require work above and beyond the Medicare Wellness Visit     Chief Complaint  Medicare Wellness-subsequent (Labs prior)    Subjective   HPI  Violeta is also being seen  "today for an annual adult preventative physical exam.  and Violeta is also being seen today for additional medical problem/s.  69-year-old white female here for [facility] as well as in follow-up for several chronic issues.  Of acute concern is her continuation of tobacco use.  She smokes a half a pack a day and has been doing so for many years.  She also has an an acute basis developed arthralgias as well as myalgias.  She often gets left scapular pain as well as thoracic pain and has been successfully using tramadol.  She has ongoing history of age-related osteoporosis and is using Prolia every 6 months.  There is history of vitamin D deficiency, as well as leukocytosis.  Patient does use albuterol HFA on a rare basis.  There are no other ongoing prescriptive medications other than those mentioned above.  Fasting labs have been acquired prior to this visit.    Review of Systems   Constitutional:         Nicotine addiction   Cardiovascular:         Hyperlipidemia   Endocrine:        Vitamin D deficiency   Musculoskeletal:  Positive for arthralgias and back pain.        Age-related osteoporosis              Objective   Vital Signs:  /72 (BP Location: Left arm, Patient Position: Sitting, Cuff Size: Adult)   Pulse 70   Temp 97.3 °F (36.3 °C) (Infrared)   Ht 157.5 cm (62.01\")   Wt 44.6 kg (98 lb 4.8 oz)   SpO2 97%   BMI 17.97 kg/m²   Physical Exam  Vitals and nursing note reviewed.   Constitutional:       Appearance: Normal appearance. She is well-developed and well-groomed.   HENT:      Head: Normocephalic and atraumatic.   Neck:      Thyroid: No thyroid mass or thyromegaly.      Vascular: Normal carotid pulses. No carotid bruit.      Trachea: Trachea and phonation normal.   Cardiovascular:      Rate and Rhythm: Normal rate and regular rhythm.      Heart sounds: Normal heart sounds. No murmur heard.     No friction rub. No gallop.   Pulmonary:      Effort: Pulmonary effort is normal. No respiratory " distress.      Breath sounds: Normal breath sounds. No decreased breath sounds, wheezing, rhonchi or rales.   Musculoskeletal:      Cervical back: Neck supple.   Lymphadenopathy:      Cervical: No cervical adenopathy.   Skin:     General: Skin is warm and dry.      Findings: No rash.   Neurological:      Mental Status: She is alert and oriented to person, place, and time.   Psychiatric:         Attention and Perception: Attention and perception normal.         Mood and Affect: Mood and affect normal.         Speech: Speech normal.         Behavior: Behavior normal. Behavior is cooperative.         Thought Content: Thought content normal.         Cognition and Memory: Cognition and memory normal.         Judgment: Judgment normal.       Hospital Outpatient Visit on 01/24/2025   Component Date Value Ref Range Status    Magnesium 01/24/2025 2.1  1.6 - 2.4 mg/dL Final    Phosphorus 01/24/2025 3.3  2.5 - 4.5 mg/dL Final    Calcium 01/24/2025 9.1  8.6 - 10.5 mg/dL Final   Orders Only on 01/23/2025   Component Date Value Ref Range Status    Glucose 01/24/2025 86  65 - 99 mg/dL Final    BUN 01/24/2025 21  8 - 23 mg/dL Final    Creatinine 01/24/2025 0.91  0.57 - 1.00 mg/dL Final    EGFR Result 01/24/2025 68.0  >60.0 mL/min/1.73 Final    Comment: GFR Categories in Chronic Kidney Disease (CKD)/X09/  /X09/  GFR Category          GFR (mL/min/1.73)    Interpretation  G1/X09/                    90 or greater/X09/        Normal or high  (1)  G2//                    60-89                Mild decrease  (1)  G3a                   45-59                Mild to moderate  decrease  G3b                   30-44                Moderate to  severe decrease  G4                    15-29                Severe decrease  G5                    14 or less           Kidney failure//  /[id number]/  (1)In the absence of evidence of kidney disease, neither  GFR category G1 or G2 fulfill the criteria for CKD.  eGFR calculation 2021 CKD-EPI  creatinine equation, which  does not include race as a factor      BUN/Creatinine Ratio 01/24/2025 23.1  7.0 - 25.0 Final    Sodium 01/24/2025 143  136 - 145 mmol/L Final    Potassium 01/24/2025 4.7  3.5 - 5.2 mmol/L Final    Chloride 01/24/2025 105  98 - 107 mmol/L Final    Total CO2 01/24/2025 27.5  22.0 - 29.0 mmol/L Final    Calcium 01/24/2025 9.5  8.6 - 10.5 mg/dL Final    Total Protein 01/24/2025 6.4  6.0 - 8.5 g/dL Final    Albumin 01/24/2025 3.8  3.5 - 5.2 g/dL Final    Globulin 01/24/2025 2.6  gm/dL Final    A/G Ratio 01/24/2025 1.5  g/dL Final    Total Bilirubin 01/24/2025 0.3  0.0 - 1.2 mg/dL Final    Alkaline Phosphatase 01/24/2025 67  39 - 117 U/L Final    AST (SGOT) 01/24/2025 25  1 - 32 U/L Final    ALT (SGPT) 01/24/2025 19  1 - 33 U/L Final    25 Hydroxy, Vitamin D 01/24/2025 54.6  30.0 - 100.0 ng/ml Final    Comment: Reference Range for Total Vitamin D 25(OH)  Deficiency <20.0 ng/mL  Insufficiency 21-29 ng/mL  Sufficiency  ng/mL  Toxicity >100 ng/ml      TSH 01/24/2025 1.990  0.270 - 4.200 uIU/mL Final    Total Cholesterol 01/24/2025 193  0 - 200 mg/dL Final    Comment: Cholesterol Reference Ranges  (U.S. Department of Health and Human Services ATP III  Classifications)  Desirable          <200 mg/dL  Borderline High    200-239 mg/dL  High Risk          >240 mg/dL  Triglyceride Reference Ranges  (U.S. Department of Health and Human Services ATP III  Classifications)  Normal           <150 mg/dL  Borderline High  150-199 mg/dL  High             200-499 mg/dL  Very High        >500 mg/dL  HDL Reference Ranges  (U.S. Department of Health and Human Services ATP III  Classifications)  Low     <40 mg/dl (major risk factor for CHD)  High    >60 mg/dl ('negative' risk factor for CHD)  LDL Reference Ranges  (U.S. Department of Health and Human Services ATP III  Classifications)  Optimal          <100 mg/dL  Near Optimal     100-129 mg/dL  Borderline High  130-159 mg/dL  High             160-189  mg/dL  Very High        >189 mg/dL  LDL is calculated using the NIH LDL-C calculation.      Triglycerides 01/24/2025 83  0 - 150 mg/dL Final    HDL Cholesterol 01/24/2025 58  40 - 60 mg/dL Final    VLDL Cholesterol Larry 01/24/2025 15  5 - 40 mg/dL Final    LDL Chol Calc (NIH) 01/24/2025 120 (H)  0 - 100 mg/dL Final    WBC 01/24/2025 7.74  3.40 - 10.80 10*3/mm3 Final    RBC 01/24/2025 4.91  3.77 - 5.28 10*6/mm3 Final    Hemoglobin 01/24/2025 14.8  12.0 - 15.9 g/dL Final    Hematocrit 01/24/2025 45.2  34.0 - 46.6 % Final    MCV 01/24/2025 92.1  79.0 - 97.0 fL Final    MCH 01/24/2025 30.1  26.6 - 33.0 pg Final    MCHC 01/24/2025 32.7  31.5 - 35.7 g/dL Final    RDW 01/24/2025 12.4  12.3 - 15.4 % Final    Platelets 01/24/2025 221  140 - 450 10*3/mm3 Final    Neutrophil Rel % 01/24/2025 45.9  42.7 - 76.0 % Final    Lymphocyte Rel % 01/24/2025 38.2  19.6 - 45.3 % Final    Monocyte Rel % 01/24/2025 8.4  5.0 - 12.0 % Final    Eosinophil Rel % 01/24/2025 6.3 (H)  0.3 - 6.2 % Final    Basophil Rel % 01/24/2025 0.8  0.0 - 1.5 % Final    Neutrophils Absolute 01/24/2025 3.55  1.70 - 7.00 10*3/mm3 Final    Lymphocytes Absolute 01/24/2025 2.96  0.70 - 3.10 10*3/mm3 Final    Monocytes Absolute 01/24/2025 0.65  0.10 - 0.90 10*3/mm3 Final    Eosinophils Absolute 01/24/2025 0.49 (H)  0.00 - 0.40 10*3/mm3 Final    Basophils Absolute 01/24/2025 0.06  0.00 - 0.20 10*3/mm3 Final    Immature Granulocyte Rel % 01/24/2025 0.4  0.0 - 0.5 % Final    Immature Grans Absolute 01/24/2025 0.03  0.00 - 0.05 10*3/mm3 Final    nRBC 01/24/2025 0.0  0.0 - 0.2 /100 WBC Final    Magnesium 01/24/2025 2.1  1.6 - 2.4 mg/dL Final    Phosphorus 01/24/2025 3.4  2.5 - 4.5 mg/dL Final         The following data was reviewed by: Baudilio Nicole DO on 01/30/2025:  Data reviewed : Radiologic studies mammography  Common labs          7/16/2024    08:41 7/16/2024    09:55 1/24/2025    10:15 1/24/2025    10:34   Common Labs   Glucose 96  92  86     BUN 28  27  21      Creatinine 0.92  0.90  0.91     Sodium 139  141  143     Potassium 4.0  4.1  4.7     Chloride 106  108  105     Calcium 8.9  9.3  9.5  9.1    Total Protein   6.4     Albumin 4.0  4.4  3.8     Total Bilirubin 0.3  0.3  0.3     Alkaline Phosphatase 44  48  67     AST (SGOT) 26  21  25     ALT (SGPT) 16  15  19     WBC  7.88  7.74     Hemoglobin  13.7  14.8     Hematocrit  42.0  45.2     Platelets  222  221     Total Cholesterol  196      Total Cholesterol   193     Triglycerides  70  83     HDL Cholesterol  57  58     LDL Cholesterol   126  120               Assessment and Plan Additional age appropriate preventative wellness advice topics were discussed during today's preventative wellness exam(some topics already addressed during AWV portion of the note above):    Physical Activity: Advised cardiovascular activity 150 minutes per week as tolerated. (example brisk walk for 30 minutes, 5 days a week).     Nutrition: Discussed nutrition plan with patient. Information shared in after visit summary. Goal is for a well balanced diet to enhance overall health.     Healthy Weight: Discussed current and goal BMI with patient. Steps to attain this goal discussed. Information shared in after visit summary.     Motor Vehicle Safety Discussion:  Wearing Seatbelt While in Motor Vehicle recommendation. Adhering to posted speed limit recommendation.     Injury Prevention Discussion:  Information shared in after visit summary.                 Medicare annual wellness visit, subsequent    Orders:    Comprehensive metabolic panel; Future    Vitamin D 25 hydroxy; Future    TSH; Future    Lipid panel; Future    CBC w AUTO Differential; Future    Magnesium; Future    Phosphorus; Future    High risk medication use    Orders:    Compliance Drug Analysis, Ur - Urine, Clean Catch    Comprehensive metabolic panel; Future    Vitamin D 25 hydroxy; Future    TSH; Future    Lipid panel; Future    CBC w AUTO Differential; Future    Magnesium;  Future    Phosphorus; Future    Strain of left shoulder, subsequent encounter    Orders:    Compliance Drug Analysis, Ur - Urine, Clean Catch    Comprehensive metabolic panel; Future    Vitamin D 25 hydroxy; Future    TSH; Future    Lipid panel; Future    CBC w AUTO Differential; Future    Magnesium; Future    Phosphorus; Future    Cigarette nicotine dependence without complication  Tobacco use is unchanged.  Smoking cessation counseling was provided.  Tobacco use will be reassessed in 6 months.                                          Orders:    Comprehensive metabolic panel; Future    Vitamin D 25 hydroxy; Future    TSH; Future    Lipid panel; Future    CBC w AUTO Differential; Future    Magnesium; Future    Phosphorus; Future    Mixed hyperlipidemia   Lipid abnormalities are stable    Plan:  Lipid lowering therapy not prescribed due to not indicated    Discussed medication dosage, use, side effects, and goals of treatment in detail.    Counseled patient on lifestyle modifications to help control hyperlipidemia.     Patient Treatment Goals:   LDL goal is under 130    Followup in 6 months.    Orders:    Comprehensive metabolic panel; Future    Vitamin D 25 hydroxy; Future    TSH; Future    Lipid panel; Future    CBC w AUTO Differential; Future    Magnesium; Future    Phosphorus; Future    Vitamin D deficiency    Orders:    Comprehensive metabolic panel; Future    Vitamin D 25 hydroxy; Future    TSH; Future    Lipid panel; Future    CBC w AUTO Differential; Future    Magnesium; Future    Phosphorus; Future    Age-related osteoporosis without current pathological fracture    Orders:    Comprehensive metabolic panel; Future    Vitamin D 25 hydroxy; Future    TSH; Future    Lipid panel; Future    CBC w AUTO Differential; Future    Magnesium; Future    Phosphorus; Future          I spent 30 minutes caring for Violeta on this date of service. This time includes time spent by me in the following activities:preparing for the  visit, reviewing tests, obtaining and/or reviewing a separately obtained history, performing a medically appropriate examination and/or evaluation , counseling and educating the patient/family/caregiver, ordering medications, tests, or procedures, referring and communicating with other health care professionals , documenting information in the medical record, independently interpreting results and communicating that information with the patient/family/caregiver, and care coordination  Follow Up   Return in about 6 months (around 7/30/2025) for Recheck.  Patient was given instructions and counseling regarding her condition or for health maintenance advice. Please see specific information pulled into the AVS if appropriate.

## 2025-01-31 NOTE — ASSESSMENT & PLAN NOTE
Lipid abnormalities are stable    Plan:  Lipid lowering therapy not prescribed due to not indicated    Discussed medication dosage, use, side effects, and goals of treatment in detail.    Counseled patient on lifestyle modifications to help control hyperlipidemia.     Patient Treatment Goals:   LDL goal is under 130    Followup in 6 months.    Orders:    Comprehensive metabolic panel; Future    Vitamin D 25 hydroxy; Future    TSH; Future    Lipid panel; Future    CBC w AUTO Differential; Future    Magnesium; Future    Phosphorus; Future

## 2025-01-31 NOTE — ASSESSMENT & PLAN NOTE
Tobacco use is unchanged.  Smoking cessation counseling was provided.  Tobacco use will be reassessed in 6 months.                                          Orders:    Comprehensive metabolic panel; Future    Vitamin D 25 hydroxy; Future    TSH; Future    Lipid panel; Future    CBC w AUTO Differential; Future    Magnesium; Future    Phosphorus; Future

## 2025-01-31 NOTE — ASSESSMENT & PLAN NOTE
Orders:    Comprehensive metabolic panel; Future    Vitamin D 25 hydroxy; Future    TSH; Future    Lipid panel; Future    CBC w AUTO Differential; Future    Magnesium; Future    Phosphorus; Future

## 2025-02-04 DIAGNOSIS — S46.912D STRAIN OF LEFT SHOULDER, SUBSEQUENT ENCOUNTER: ICD-10-CM

## 2025-02-04 RX ORDER — TRAMADOL HYDROCHLORIDE 50 MG/1
50 TABLET ORAL EVERY 6 HOURS PRN
Qty: 30 TABLET | Refills: 3 | Status: SHIPPED | OUTPATIENT
Start: 2025-02-04

## 2025-02-04 NOTE — TELEPHONE ENCOUNTER
LS: 1/30/25  NOV: 2/5/26  UDS: 1/30/25  LF: 1/22/25  CONTRACT: 7/22/24    Please advise for refills

## 2025-02-05 LAB — DRUGS UR: NORMAL

## 2025-06-18 ENCOUNTER — TRANSCRIBE ORDERS (OUTPATIENT)
Dept: ADMINISTRATIVE | Facility: HOSPITAL | Age: 70
End: 2025-06-18
Payer: MEDICARE

## 2025-06-18 DIAGNOSIS — Z12.31 BREAST CANCER SCREENING BY MAMMOGRAM: Primary | ICD-10-CM

## 2025-07-21 DIAGNOSIS — S46.912D STRAIN OF LEFT SHOULDER, SUBSEQUENT ENCOUNTER: ICD-10-CM

## 2025-07-21 DIAGNOSIS — E55.9 VITAMIN D DEFICIENCY: ICD-10-CM

## 2025-07-21 DIAGNOSIS — E78.2 MIXED HYPERLIPIDEMIA: ICD-10-CM

## 2025-07-21 DIAGNOSIS — Z79.899 HIGH RISK MEDICATION USE: ICD-10-CM

## 2025-07-21 DIAGNOSIS — Z00.00 MEDICARE ANNUAL WELLNESS VISIT, SUBSEQUENT: ICD-10-CM

## 2025-07-21 DIAGNOSIS — M81.0 AGE-RELATED OSTEOPOROSIS WITHOUT CURRENT PATHOLOGICAL FRACTURE: ICD-10-CM

## 2025-07-21 DIAGNOSIS — F17.210 CIGARETTE NICOTINE DEPENDENCE WITHOUT COMPLICATION: ICD-10-CM

## 2025-07-23 LAB
25(OH)D3+25(OH)D2 SERPL-MCNC: 50.5 NG/ML (ref 30–100)
ALBUMIN SERPL-MCNC: 4.5 G/DL (ref 3.5–5.2)
ALBUMIN/GLOB SERPL: 1.9 G/DL
ALP SERPL-CCNC: 102 U/L (ref 39–117)
ALT SERPL-CCNC: 91 U/L (ref 1–33)
AST SERPL-CCNC: 98 U/L (ref 1–32)
BASOPHILS # BLD AUTO: 0.06 10*3/MM3 (ref 0–0.2)
BASOPHILS NFR BLD AUTO: 0.8 % (ref 0–1.5)
BILIRUB SERPL-MCNC: 0.4 MG/DL (ref 0–1.2)
BUN SERPL-MCNC: 17 MG/DL (ref 8–23)
BUN/CREAT SERPL: 19.8 (ref 7–25)
CALCIUM SERPL-MCNC: 9.2 MG/DL (ref 8.6–10.5)
CHLORIDE SERPL-SCNC: 105 MMOL/L (ref 98–107)
CHOLEST SERPL-MCNC: 209 MG/DL (ref 0–200)
CO2 SERPL-SCNC: 25 MMOL/L (ref 22–29)
CREAT SERPL-MCNC: 0.86 MG/DL (ref 0.57–1)
EGFRCR SERPLBLD CKD-EPI 2021: 72.8 ML/MIN/1.73
EOSINOPHIL # BLD AUTO: 0.6 10*3/MM3 (ref 0–0.4)
EOSINOPHIL NFR BLD AUTO: 8.2 % (ref 0.3–6.2)
ERYTHROCYTE [DISTWIDTH] IN BLOOD BY AUTOMATED COUNT: 13.3 % (ref 12.3–15.4)
GLOBULIN SER CALC-MCNC: 2.4 GM/DL
GLUCOSE SERPL-MCNC: 90 MG/DL (ref 65–99)
HCT VFR BLD AUTO: 42.2 % (ref 34–46.6)
HDLC SERPL-MCNC: 62 MG/DL (ref 40–60)
HGB BLD-MCNC: 14.1 G/DL (ref 12–15.9)
IMM GRANULOCYTES # BLD AUTO: 0.02 10*3/MM3 (ref 0–0.05)
IMM GRANULOCYTES NFR BLD AUTO: 0.3 % (ref 0–0.5)
LDLC SERPL CALC-MCNC: 134 MG/DL (ref 0–100)
LYMPHOCYTES # BLD AUTO: 2.63 10*3/MM3 (ref 0.7–3.1)
LYMPHOCYTES NFR BLD AUTO: 36.1 % (ref 19.6–45.3)
MAGNESIUM SERPL-MCNC: 2.2 MG/DL (ref 1.6–2.4)
MCH RBC QN AUTO: 30.4 PG (ref 26.6–33)
MCHC RBC AUTO-ENTMCNC: 33.4 G/DL (ref 31.5–35.7)
MCV RBC AUTO: 90.9 FL (ref 79–97)
MONOCYTES # BLD AUTO: 0.49 10*3/MM3 (ref 0.1–0.9)
MONOCYTES NFR BLD AUTO: 6.7 % (ref 5–12)
NEUTROPHILS # BLD AUTO: 3.48 10*3/MM3 (ref 1.7–7)
NEUTROPHILS NFR BLD AUTO: 47.9 % (ref 42.7–76)
NRBC BLD AUTO-RTO: 0 /100 WBC (ref 0–0.2)
PHOSPHATE SERPL-MCNC: 3.2 MG/DL (ref 2.5–4.5)
PLATELET # BLD AUTO: 241 10*3/MM3 (ref 140–450)
POTASSIUM SERPL-SCNC: 4.2 MMOL/L (ref 3.5–5.2)
PROT SERPL-MCNC: 6.9 G/DL (ref 6–8.5)
RBC # BLD AUTO: 4.64 10*6/MM3 (ref 3.77–5.28)
SODIUM SERPL-SCNC: 141 MMOL/L (ref 136–145)
TRIGL SERPL-MCNC: 74 MG/DL (ref 0–150)
TSH SERPL DL<=0.005 MIU/L-ACNC: 2.48 UIU/ML (ref 0.27–4.2)
VLDLC SERPL CALC-MCNC: 13 MG/DL (ref 5–40)
WBC # BLD AUTO: 7.28 10*3/MM3 (ref 3.4–10.8)

## 2025-07-28 ENCOUNTER — HOSPITAL ENCOUNTER (OUTPATIENT)
Dept: INFUSION THERAPY | Facility: HOSPITAL | Age: 70
Discharge: HOME | End: 2025-07-28
Payer: MEDICARE

## 2025-07-28 ENCOUNTER — HOSPITAL ENCOUNTER (OUTPATIENT)
Dept: MAMMOGRAPHY | Facility: HOSPITAL | Age: 70
Discharge: HOME | End: 2025-07-28
Payer: MEDICARE

## 2025-07-28 ENCOUNTER — OFFICE VISIT (OUTPATIENT)
Dept: FAMILY MEDICINE CLINIC | Facility: CLINIC | Age: 70
End: 2025-07-28
Payer: MEDICARE

## 2025-07-28 VITALS
OXYGEN SATURATION: 97 % | WEIGHT: 102.8 LBS | BODY MASS INDEX: 18.92 KG/M2 | DIASTOLIC BLOOD PRESSURE: 84 MMHG | TEMPERATURE: 97.8 F | HEART RATE: 81 BPM | HEIGHT: 62 IN | SYSTOLIC BLOOD PRESSURE: 118 MMHG

## 2025-07-28 DIAGNOSIS — M81.0 AGE-RELATED OSTEOPOROSIS WITHOUT CURRENT PATHOLOGICAL FRACTURE: Primary | ICD-10-CM

## 2025-07-28 DIAGNOSIS — R79.89 ELEVATED LFTS: Primary | ICD-10-CM

## 2025-07-28 DIAGNOSIS — K75.9 INFLAMMATORY LIVER DISEASE, UNSPECIFIED: ICD-10-CM

## 2025-07-28 DIAGNOSIS — F17.210 CIGARETTE NICOTINE DEPENDENCE WITHOUT COMPLICATION: ICD-10-CM

## 2025-07-28 DIAGNOSIS — Z12.31 BREAST CANCER SCREENING BY MAMMOGRAM: ICD-10-CM

## 2025-07-28 DIAGNOSIS — E78.2 MIXED HYPERLIPIDEMIA: ICD-10-CM

## 2025-07-28 DIAGNOSIS — M81.0 AGE-RELATED OSTEOPOROSIS WITHOUT CURRENT PATHOLOGICAL FRACTURE: ICD-10-CM

## 2025-07-28 DIAGNOSIS — K76.9 DISEASE OF LIVER: ICD-10-CM

## 2025-07-28 DIAGNOSIS — E55.9 VITAMIN D DEFICIENCY: ICD-10-CM

## 2025-07-28 PROCEDURE — 1126F AMNT PAIN NOTED NONE PRSNT: CPT | Performed by: FAMILY MEDICINE

## 2025-07-28 PROCEDURE — 1160F RVW MEDS BY RX/DR IN RCRD: CPT | Performed by: FAMILY MEDICINE

## 2025-07-28 PROCEDURE — 77063 BREAST TOMOSYNTHESIS BI: CPT

## 2025-07-28 PROCEDURE — 77067 SCR MAMMO BI INCL CAD: CPT | Performed by: RADIOLOGY

## 2025-07-28 PROCEDURE — 25010000002 DENOSUMAB 60 MG/ML SOLUTION PREFILLED SYRINGE: Performed by: FAMILY MEDICINE

## 2025-07-28 PROCEDURE — 96372 THER/PROPH/DIAG INJ SC/IM: CPT

## 2025-07-28 PROCEDURE — 77067 SCR MAMMO BI INCL CAD: CPT

## 2025-07-28 PROCEDURE — 1159F MED LIST DOCD IN RCRD: CPT | Performed by: FAMILY MEDICINE

## 2025-07-28 PROCEDURE — 77063 BREAST TOMOSYNTHESIS BI: CPT | Performed by: RADIOLOGY

## 2025-07-28 PROCEDURE — 99214 OFFICE O/P EST MOD 30 MIN: CPT | Performed by: FAMILY MEDICINE

## 2025-07-28 RX ADMIN — DENOSUMAB 60 MG: 60 INJECTION SUBCUTANEOUS at 08:28

## 2025-07-28 NOTE — PATIENT INSTRUCTIONS
"  Call [facility] at [phone] if you have any problems or concerns.    We know you have a Choice in healthcare and appreciate you using [facility].  Our purpose is to provide you \"Excellent Care\".  We hope that you will always choose us in the future and continue to recommend us to your family and friends.              "

## 2025-07-28 NOTE — PROGRESS NOTES
Subjective   Violeta Neff is a 69 y.o. female with   Chief Complaint   Patient presents with    Hyperlipidemia     Labs prior    Vitamin D Deficiency    Osteoporosis   .    Hyperlipidemia    Osteoporosis     69-year-old white female with known history of age-related osteoporosis, vitamin D deficiency as well as hyperlipidemia here for further medical management.  Current medications include every 6-month Prolia injection as well as tramadol used on a very sparing basis.  She does use ibuprofen at 400 mg when used as well as a host of supplements and vitamins such as vitamin D3.  All medications and supplements are used appropriately and are well-tolerated without side effects.  Fasting labs have been acquired prior to this visit.  She does state that her last DEXA scan was in September 2023 and she is due for 1 this coming fall.  She just had her last Prolia shot last week and will need a therapy plan placed for her next shot.  She has not been traveling and states that she has not been ill.  There has been no change in medication and she is not using any new supplement or vitamin.  Fasting labs have been acquired prior to this visit.  The following portions of the patient's history were reviewed and updated as appropriate: allergies, current medications, past family history, past medical history, past social history, past surgical history and problem list.    Review of Systems   Constitutional:         Nicotine addiction-smokes half a pack of cigarettes per day   Cardiovascular:         Hyperlipidemia   Musculoskeletal:         Age-related osteoporosis       Objective     Vitals:    07/28/25 1010   BP: 118/84   Pulse: 81   Temp: 97.8 °F (36.6 °C)   SpO2: 97%       Recent Results (from the past 4 weeks)   Comprehensive metabolic panel    Collection Time: 07/22/25  9:56 AM    Specimen: Blood   Result Value Ref Range    Glucose 90 65 - 99 mg/dL    BUN 17.0 8.0 - 23.0 mg/dL    Creatinine 0.86 0.57 - 1.00 mg/dL     EGFR Result 72.8 >60.0 mL/min/1.73    BUN/Creatinine Ratio 19.8 7.0 - 25.0    Sodium 141 136 - 145 mmol/L    Potassium 4.2 3.5 - 5.2 mmol/L    Chloride 105 98 - 107 mmol/L    Total CO2 25.0 22.0 - 29.0 mmol/L    Calcium 9.2 8.6 - 10.5 mg/dL    Total Protein 6.9 6.0 - 8.5 g/dL    Albumin 4.5 3.5 - 5.2 g/dL    Globulin 2.4 gm/dL    A/G Ratio 1.9 g/dL    Total Bilirubin 0.4 0.0 - 1.2 mg/dL    Alkaline Phosphatase 102 39 - 117 U/L    AST (SGOT) 98 (H) 1 - 32 U/L    ALT (SGPT) 91 (H) 1 - 33 U/L   Vitamin D 25 hydroxy    Collection Time: 07/22/25  9:56 AM    Specimen: Blood   Result Value Ref Range    25 Hydroxy, Vitamin D 50.5 30.0 - 100.0 ng/ml   TSH    Collection Time: 07/22/25  9:56 AM    Specimen: Blood   Result Value Ref Range    TSH 2.480 0.270 - 4.200 uIU/mL   Lipid panel    Collection Time: 07/22/25  9:56 AM    Specimen: Blood   Result Value Ref Range    Total Cholesterol 209 (H) 0 - 200 mg/dL    Triglycerides 74 0 - 150 mg/dL    HDL Cholesterol 62 (H) 40 - 60 mg/dL    VLDL Cholesterol Larry 13 5 - 40 mg/dL    LDL Chol Calc (NIH) 134 (H) 0 - 100 mg/dL   CBC w AUTO Differential    Collection Time: 07/22/25  9:56 AM    Specimen: Blood   Result Value Ref Range    WBC 7.28 3.40 - 10.80 10*3/mm3    RBC 4.64 3.77 - 5.28 10*6/mm3    Hemoglobin 14.1 12.0 - 15.9 g/dL    Hematocrit 42.2 34.0 - 46.6 %    MCV 90.9 79.0 - 97.0 fL    MCH 30.4 26.6 - 33.0 pg    MCHC 33.4 31.5 - 35.7 g/dL    RDW 13.3 12.3 - 15.4 %    Platelets 241 140 - 450 10*3/mm3    Neutrophil Rel % 47.9 42.7 - 76.0 %    Lymphocyte Rel % 36.1 19.6 - 45.3 %    Monocyte Rel % 6.7 5.0 - 12.0 %    Eosinophil Rel % 8.2 (H) 0.3 - 6.2 %    Basophil Rel % 0.8 0.0 - 1.5 %    Neutrophils Absolute 3.48 1.70 - 7.00 10*3/mm3    Lymphocytes Absolute 2.63 0.70 - 3.10 10*3/mm3    Monocytes Absolute 0.49 0.10 - 0.90 10*3/mm3    Eosinophils Absolute 0.60 (H) 0.00 - 0.40 10*3/mm3    Basophils Absolute 0.06 0.00 - 0.20 10*3/mm3    Immature Granulocyte Rel % 0.3 0.0 - 0.5 %     Immature Grans Absolute 0.02 0.00 - 0.05 10*3/mm3    nRBC 0.0 0.0 - 0.2 /100 WBC   Magnesium    Collection Time: 07/22/25  9:56 AM    Specimen: Blood   Result Value Ref Range    Magnesium 2.2 1.6 - 2.4 mg/dL   Phosphorus    Collection Time: 07/22/25  9:56 AM    Specimen: Blood   Result Value Ref Range    Phosphorus 3.2 2.5 - 4.5 mg/dL       Physical Exam  Vitals and nursing note reviewed.   Constitutional:       Appearance: Normal appearance. She is well-developed and well-groomed.   HENT:      Head: Normocephalic and atraumatic.   Neck:      Thyroid: No thyroid mass or thyromegaly.      Vascular: Normal carotid pulses. No carotid bruit.      Trachea: Trachea and phonation normal.   Cardiovascular:      Rate and Rhythm: Normal rate and regular rhythm.      Heart sounds: Normal heart sounds. No murmur heard.     No friction rub. No gallop.   Pulmonary:      Effort: Pulmonary effort is normal. No respiratory distress.      Breath sounds: Normal breath sounds. No decreased breath sounds, wheezing, rhonchi or rales.   Musculoskeletal:      Cervical back: Neck supple.   Lymphadenopathy:      Cervical: No cervical adenopathy.   Skin:     General: Skin is warm and dry.      Findings: No rash.   Neurological:      Mental Status: She is alert and oriented to person, place, and time.   Psychiatric:         Attention and Perception: Attention and perception normal.         Mood and Affect: Mood and affect normal.         Speech: Speech normal.         Behavior: Behavior normal. Behavior is cooperative.         Thought Content: Thought content normal.         Cognition and Memory: Cognition and memory normal.         Judgment: Judgment normal.         Assessment & Plan   Diagnoses and all orders for this visit:    1. Elevated LFTs (Primary)  -     Comprehensive metabolic panel  -     Hepatitis panel, acute  -     Gamma GT    2. Disease of liver  -     Comprehensive metabolic panel  -     Hepatitis panel, acute  -     Gamma  GT    3. Inflammatory liver disease, unspecified  -     Hepatitis panel, acute    4. Mixed hyperlipidemia    5. Vitamin D deficiency    6. Age-related osteoporosis without current pathological fracture  -     DEXA Bone Density Axial; Future  -     Ambulatory Referral to [facility] For Infusion Treatment    7. Cigarette nicotine dependence without complication    Other orders  -     denosumab (PROLIA) syringe 60 mg    Anticipate next DEXA scan in late September of this year and next Prolia injection in approximately 6 months.  Will follow patient up in 6 months preceded by fasting labs.  Will require a liver evaluation with repeat LFTs and GGT.  Will screen for hepatitis.  Further evaluation based on the above results.  It has been recommended that patient discontinue smoking    Return in about 6 months (around 1/28/2026) for Recheck.  BMI is within normal parameters. No other follow-up for BMI required.

## 2025-07-28 NOTE — NURSING NOTE
Pt arrived to [facility] for appt. VSS, no complaints at this time. Medication administered per MD order. Pt tolerated well. AVS printed out, copy given to pt. Pt discharged from [facility] at 8:29 AM in stable condition, without complaints.    negative - no vomiting, no diarrhea

## 2025-07-29 LAB
ALBUMIN SERPL-MCNC: 4.4 G/DL (ref 3.9–4.9)
ALP SERPL-CCNC: 89 IU/L (ref 44–121)
ALT SERPL-CCNC: 25 IU/L (ref 0–32)
AST SERPL-CCNC: 21 IU/L (ref 0–40)
BILIRUB SERPL-MCNC: 0.3 MG/DL (ref 0–1.2)
BUN SERPL-MCNC: 13 MG/DL (ref 8–27)
BUN/CREAT SERPL: 15 (ref 12–28)
CALCIUM SERPL-MCNC: 9.6 MG/DL (ref 8.7–10.3)
CHLORIDE SERPL-SCNC: 104 MMOL/L (ref 96–106)
CO2 SERPL-SCNC: 22 MMOL/L (ref 20–29)
CREAT SERPL-MCNC: 0.89 MG/DL (ref 0.57–1)
EGFRCR SERPLBLD CKD-EPI 2021: 70 ML/MIN/1.73
GGT SERPL-CCNC: 54 IU/L (ref 0–60)
GLOBULIN SER CALC-MCNC: 2.3 G/DL (ref 1.5–4.5)
GLUCOSE SERPL-MCNC: 89 MG/DL (ref 70–99)
HAV IGM SERPL QL IA: NEGATIVE
HBV CORE IGM SERPL QL IA: NEGATIVE
HBV SURFACE AG SERPL QL IA: NEGATIVE
HCV AB SERPL QL IA: NON REACTIVE
HCV AB SERPL QL IA: NORMAL
POTASSIUM SERPL-SCNC: 4.5 MMOL/L (ref 3.5–5.2)
PROT SERPL-MCNC: 6.7 G/DL (ref 6–8.5)
SODIUM SERPL-SCNC: 141 MMOL/L (ref 134–144)

## 2025-08-21 DIAGNOSIS — S46.912D STRAIN OF LEFT SHOULDER, SUBSEQUENT ENCOUNTER: ICD-10-CM

## 2025-08-21 RX ORDER — TRAMADOL HYDROCHLORIDE 50 MG/1
50 TABLET ORAL
Qty: 30 TABLET | Refills: 0 | Status: SHIPPED | OUTPATIENT
Start: 2025-08-21